# Patient Record
Sex: FEMALE | Race: WHITE
[De-identification: names, ages, dates, MRNs, and addresses within clinical notes are randomized per-mention and may not be internally consistent; named-entity substitution may affect disease eponyms.]

---

## 2006-12-04 VITALS — DIASTOLIC BLOOD PRESSURE: 54 MMHG | SYSTOLIC BLOOD PRESSURE: 101 MMHG

## 2017-01-17 ENCOUNTER — HOSPITAL ENCOUNTER (EMERGENCY)
Dept: HOSPITAL 19 - COL.ER | Age: 57
LOS: 1 days | Discharge: HOME | End: 2017-01-18
Payer: COMMERCIAL

## 2017-01-17 VITALS — TEMPERATURE: 98.7 F | HEART RATE: 108 BPM

## 2017-01-17 VITALS — HEIGHT: 64.02 IN | BODY MASS INDEX: 41.06 KG/M2 | WEIGHT: 240.52 LBS

## 2017-01-17 DIAGNOSIS — E66.9: ICD-10-CM

## 2017-01-17 DIAGNOSIS — L02.211: Primary | ICD-10-CM

## 2017-01-17 DIAGNOSIS — L03.311: ICD-10-CM

## 2017-01-17 LAB
ADJUSTED CALCIUM: 10.5 MG/DL (ref 8.4–10.2)
ALBUMIN SERPL-MCNC: 4.1 GM/DL (ref 3.5–5)
ALP SERPL-CCNC: 99 U/L (ref 50–136)
ALT SERPL-CCNC: 31 U/L (ref 9–52)
ANION GAP SERPL CALC-SCNC: 9 MMOL/L (ref 7–16)
BASOPHILS # BLD: 0 10*3/UL (ref 0–0.2)
BASOPHILS NFR BLD AUTO: 0.3 % (ref 0–2)
BILIRUB SERPL-MCNC: 0.7 MG/DL (ref 0–1)
BUN SERPL-MCNC: 10 MG/DL (ref 7–17)
CALCIUM SERPL-MCNC: 10.6 MG/DL (ref 8.4–10.2)
CHLORIDE SERPL-SCNC: 102 MMOL/L (ref 98–107)
CO2 SERPL-SCNC: 29 MMOL/L (ref 22–30)
CREAT SERPL-SCNC: 0.68 MG/DL (ref 0.52–1.25)
CRP SERPL-MCNC: 2.2 MG/DL (ref 0–0.9)
EOSINOPHIL # BLD: 0.2 10*3/UL (ref 0–0.7)
EOSINOPHIL NFR BLD: 2.6 % (ref 0–4)
ERYTHROCYTE [DISTWIDTH] IN BLOOD BY AUTOMATED COUNT: 13.2 % (ref 11.5–14.5)
GLUCOSE SERPL-MCNC: 126 MG/DL (ref 74–106)
GRANULOCYTES # BLD AUTO: 66.1 % (ref 42.2–75.2)
HCT VFR BLD AUTO: 45.3 % (ref 37–47)
HGB BLD-MCNC: 15 G/DL (ref 12.5–16)
LYMPHOCYTES # BLD: 2.1 10*3/UL (ref 1.2–3.4)
LYMPHOCYTES NFR BLD: 23.8 % (ref 20–51)
MCH RBC QN AUTO: 31 PG (ref 27–31)
MCHC RBC AUTO-ENTMCNC: 33 G/DL (ref 33–37)
MCV RBC AUTO: 94 FL (ref 80–100)
MONOCYTES # BLD: 0.6 10*3/UL (ref 0.1–0.6)
MONOCYTES NFR BLD AUTO: 6.8 % (ref 1.7–9.3)
NEUTROPHILS # BLD: 5.9 10*3/UL (ref 1.4–6.5)
PLATELET # BLD AUTO: 212 K/MM3 (ref 130–400)
PMV BLD AUTO: 11.7 FL (ref 7.4–10.4)
POTASSIUM SERPL-SCNC: 4.2 MMOL/L (ref 3.4–5)
PROT SERPL-MCNC: 7.4 GM/DL (ref 6.4–8.2)
RBC # BLD AUTO: 4.81 M/MM3 (ref 4.1–5.3)
SODIUM SERPL-SCNC: 140 MMOL/L (ref 137–145)
WBC # BLD AUTO: 8.9 K/MM3 (ref 4.8–10.8)

## 2017-01-18 VITALS — SYSTOLIC BLOOD PRESSURE: 125 MMHG | DIASTOLIC BLOOD PRESSURE: 57 MMHG

## 2018-12-15 ENCOUNTER — HOSPITAL ENCOUNTER (OUTPATIENT)
Dept: HOSPITAL 6 - ED | Age: 58
Setting detail: OBSERVATION
LOS: 4 days | Discharge: HOME | End: 2018-12-19
Attending: GENERAL PRACTICE | Admitting: GENERAL PRACTICE
Payer: SELF-PAY

## 2018-12-15 VITALS — SYSTOLIC BLOOD PRESSURE: 127 MMHG | DIASTOLIC BLOOD PRESSURE: 81 MMHG

## 2018-12-15 VITALS — DIASTOLIC BLOOD PRESSURE: 90 MMHG | SYSTOLIC BLOOD PRESSURE: 142 MMHG

## 2018-12-15 VITALS — HEIGHT: 64.02 IN | WEIGHT: 276.68 LBS | BODY MASS INDEX: 47.24 KG/M2

## 2018-12-15 DIAGNOSIS — I10: ICD-10-CM

## 2018-12-15 DIAGNOSIS — J96.11: ICD-10-CM

## 2018-12-15 DIAGNOSIS — G47.36: ICD-10-CM

## 2018-12-15 DIAGNOSIS — Z91.14: ICD-10-CM

## 2018-12-15 DIAGNOSIS — F17.210: ICD-10-CM

## 2018-12-15 DIAGNOSIS — Z99.81: ICD-10-CM

## 2018-12-15 DIAGNOSIS — Z83.3: ICD-10-CM

## 2018-12-15 DIAGNOSIS — I47.1: ICD-10-CM

## 2018-12-15 DIAGNOSIS — E66.9: ICD-10-CM

## 2018-12-15 DIAGNOSIS — K21.9: ICD-10-CM

## 2018-12-15 DIAGNOSIS — R73.9: ICD-10-CM

## 2018-12-15 DIAGNOSIS — J44.1: Primary | ICD-10-CM

## 2018-12-15 DIAGNOSIS — G47.30: ICD-10-CM

## 2018-12-15 DIAGNOSIS — Z82.49: ICD-10-CM

## 2018-12-15 DIAGNOSIS — Z79.82: ICD-10-CM

## 2018-12-15 DIAGNOSIS — Z79.899: ICD-10-CM

## 2018-12-15 LAB
ALBUMIN SERPL-MCNC: 4.1 G/DL (ref 3.5–5)
ALT SERPL-CCNC: 16 U/L (ref 9–52)
APPEARANCE UR: (no result)
AST SERPL-CCNC: 10 U/L (ref 14–36)
BASOPHILS # BLD: 0 10*3/UL (ref 0.02–0.1)
BILIRUB SERPL-MCNC: 0.8 MG/DL (ref 0.2–1.3)
BILIRUB UR QL STRIP.AUTO: NEGATIVE
CALCIUM SERPL-MCNC: 10.2 MG/DL (ref 8.4–10.2)
CO2 SERPL-SCNC: 32 MMOL/L (ref 22–30)
COLOR UR AUTO: YELLOW
EOSINOPHIL # BLD: 0.1 10*3/UL (ref 0.04–0.4)
EOSINOPHIL NFR BLD: 1.3 % (ref 1–5)
ERYTHROCYTE [DISTWIDTH] IN BLOOD BY AUTOMATED COUNT: 13.8 % (ref 11.5–14.5)
GLUCOSE SERPL-MCNC: 104 MG/DL (ref 65–105)
GLUCOSE UR QL STRIP.AUTO: NEGATIVE MG/DL
HCT VFR BLD AUTO: 47.1 % (ref 37–47)
HGB BLD-MCNC: 15.1 G/DL (ref 12.5–16)
KETONES UR QL STRIP.AUTO: NEGATIVE
LEUKOCYTE ESTERASE UR QL STRIP: NEGATIVE
LYMPHOCYTES # BLD: 1.2 10*3/UL (ref 1.5–4)
MCH RBC QN AUTO: 31 PG (ref 27–31)
MCHC RBC AUTO-ENTMCNC: 32 G/DL (ref 33–37)
MCV RBC AUTO: 97 FL (ref 78–100)
MONOCYTES # BLD: 0.6 10*3/UL (ref 0.2–0.8)
MUCOUS THREADS URNS QL MICRO: PRESENT
NEUTROPHILS # BLD: 7.1 10*3/UL (ref 1.4–6.5)
NITRITE UR QL STRIP: NEGATIVE
PH UR STRIP.AUTO: 5 [PH] (ref 5–8)
PLATELET # BLD AUTO: 235 K/MM3 (ref 130–400)
PMV BLD AUTO: 10.7 FL (ref 7.4–10.4)
POTASSIUM SERPL-SCNC: 3.9 MMOL/L (ref 3.6–5)
PROT ?TM UR-MCNC: (no result) MG/DL
PROT SERPL-MCNC: 7.6 G/DL (ref 6.3–8.2)
RBC # BLD AUTO: 4.86 M/MM3 (ref 4.1–5.3)
RBC UR QL AUTO: (no result)
SODIUM SERPL-SCNC: 139 MMOL/L (ref 137–145)
SP GR UR STRIP.AUTO: 1.02 (ref 1–1.03)
UROBILINOGEN UR-MCNC: NORMAL MG/DL
WBC # BLD AUTO: 9.1 K/MM3 (ref 4.8–10.8)
WBC #/AREA URNS HPF: (no result) /HPF (ref 0–3)

## 2018-12-15 PROCEDURE — G0378 HOSPITAL OBSERVATION PER HR: HCPCS

## 2018-12-16 VITALS — DIASTOLIC BLOOD PRESSURE: 68 MMHG | SYSTOLIC BLOOD PRESSURE: 106 MMHG

## 2018-12-16 VITALS — DIASTOLIC BLOOD PRESSURE: 55 MMHG | SYSTOLIC BLOOD PRESSURE: 106 MMHG

## 2018-12-16 VITALS — DIASTOLIC BLOOD PRESSURE: 88 MMHG | SYSTOLIC BLOOD PRESSURE: 156 MMHG

## 2018-12-16 VITALS — SYSTOLIC BLOOD PRESSURE: 169 MMHG | DIASTOLIC BLOOD PRESSURE: 89 MMHG

## 2018-12-16 VITALS — DIASTOLIC BLOOD PRESSURE: 71 MMHG | SYSTOLIC BLOOD PRESSURE: 139 MMHG

## 2018-12-16 VITALS — DIASTOLIC BLOOD PRESSURE: 70 MMHG | SYSTOLIC BLOOD PRESSURE: 114 MMHG

## 2018-12-16 LAB
CALCIUM SERPL-MCNC: 10.1 MG/DL (ref 8.4–10.2)
CO2 SERPL-SCNC: 33 MMOL/L (ref 22–30)
ERYTHROCYTE [DISTWIDTH] IN BLOOD BY AUTOMATED COUNT: 13.6 % (ref 11.5–14.5)
GLUCOSE SERPL-MCNC: 159 MG/DL (ref 65–105)
HCT VFR BLD AUTO: 48.1 % (ref 37–47)
HGB BLD-MCNC: 15.2 G/DL (ref 12.5–16)
LYMPHOCYTES NFR BLD MANUAL: 6 % (ref 20–51)
MCH RBC QN AUTO: 31 PG (ref 27–31)
MCHC RBC AUTO-ENTMCNC: 32 G/DL (ref 33–37)
MCV RBC AUTO: 98 FL (ref 78–100)
MONOCYTES NFR BLD: 3 % (ref 3–10)
NEUTS SEG NFR BLD MANUAL: 91 % (ref 42–75)
PLATELET # BLD AUTO: 199 K/MM3 (ref 130–400)
PMV BLD AUTO: 11.8 FL (ref 7.4–10.4)
POTASSIUM SERPL-SCNC: 4.5 MMOL/L (ref 3.6–5)
RBC # BLD AUTO: 4.93 M/MM3 (ref 4.1–5.3)
SODIUM SERPL-SCNC: 138 MMOL/L (ref 137–145)
WBC # BLD AUTO: 8.8 K/MM3 (ref 4.8–10.8)

## 2018-12-17 VITALS — SYSTOLIC BLOOD PRESSURE: 101 MMHG | DIASTOLIC BLOOD PRESSURE: 61 MMHG

## 2018-12-17 VITALS — SYSTOLIC BLOOD PRESSURE: 105 MMHG | DIASTOLIC BLOOD PRESSURE: 57 MMHG

## 2018-12-17 VITALS — SYSTOLIC BLOOD PRESSURE: 137 MMHG | DIASTOLIC BLOOD PRESSURE: 83 MMHG

## 2018-12-17 VITALS — DIASTOLIC BLOOD PRESSURE: 77 MMHG | SYSTOLIC BLOOD PRESSURE: 151 MMHG

## 2018-12-17 VITALS — DIASTOLIC BLOOD PRESSURE: 71 MMHG | SYSTOLIC BLOOD PRESSURE: 130 MMHG

## 2018-12-17 LAB
ALBUMIN SERPL-MCNC: 4.2 G/DL (ref 3.5–5)
ALT SERPL-CCNC: 11 U/L (ref 9–52)
AST SERPL-CCNC: 18 U/L (ref 14–36)
BILIRUB SERPL-MCNC: 0.3 MG/DL (ref 0.2–1.3)
CALCIUM SERPL-MCNC: 10.8 MG/DL (ref 8.4–10.2)
CO2 SERPL-SCNC: 32 MMOL/L (ref 22–30)
ERYTHROCYTE [DISTWIDTH] IN BLOOD BY AUTOMATED COUNT: 13.8 % (ref 11.5–14.5)
GLUCOSE SERPL-MCNC: 151 MG/DL (ref 65–105)
HCT VFR BLD AUTO: 46.9 % (ref 37–47)
HGB BLD-MCNC: 14.8 G/DL (ref 12.5–16)
LYMPHOCYTES NFR BLD MANUAL: 7 % (ref 20–51)
MCH RBC QN AUTO: 31 PG (ref 27–31)
MCHC RBC AUTO-ENTMCNC: 32 G/DL (ref 33–37)
MCV RBC AUTO: 98 FL (ref 78–100)
MONOCYTES NFR BLD: 5 % (ref 3–10)
NEUTS SEG NFR BLD MANUAL: 88 % (ref 42–75)
PLATELET # BLD AUTO: 252 K/MM3 (ref 130–400)
PMV BLD AUTO: 11.4 FL (ref 7.4–10.4)
POTASSIUM SERPL-SCNC: 4.5 MMOL/L (ref 3.6–5)
PROT SERPL-MCNC: 7.4 G/DL (ref 6.3–8.2)
RBC # BLD AUTO: 4.79 M/MM3 (ref 4.1–5.3)
SODIUM SERPL-SCNC: 136 MMOL/L (ref 137–145)
WBC # BLD AUTO: 14.8 K/MM3 (ref 4.8–10.8)

## 2018-12-18 ENCOUNTER — HOSPITAL ENCOUNTER (OUTPATIENT)
Dept: HOSPITAL 19 - ZLAB.WCH | Age: 58
End: 2018-12-18

## 2018-12-18 VITALS — SYSTOLIC BLOOD PRESSURE: 133 MMHG | DIASTOLIC BLOOD PRESSURE: 61 MMHG

## 2018-12-18 VITALS — SYSTOLIC BLOOD PRESSURE: 160 MMHG | DIASTOLIC BLOOD PRESSURE: 66 MMHG

## 2018-12-18 VITALS — SYSTOLIC BLOOD PRESSURE: 145 MMHG | DIASTOLIC BLOOD PRESSURE: 84 MMHG

## 2018-12-18 VITALS — DIASTOLIC BLOOD PRESSURE: 60 MMHG | SYSTOLIC BLOOD PRESSURE: 105 MMHG

## 2018-12-18 VITALS — SYSTOLIC BLOOD PRESSURE: 154 MMHG | DIASTOLIC BLOOD PRESSURE: 73 MMHG

## 2018-12-18 VITALS — SYSTOLIC BLOOD PRESSURE: 128 MMHG | DIASTOLIC BLOOD PRESSURE: 75 MMHG

## 2018-12-18 DIAGNOSIS — Z01.89: Primary | ICD-10-CM

## 2018-12-18 LAB
APPEARANCE UR: CLEAR
BASOPHILS # BLD: 0 10*3/UL (ref 0.02–0.1)
BILIRUB UR QL STRIP.AUTO: NEGATIVE
CALCIUM SERPL-MCNC: 10.8 MG/DL (ref 8.4–10.2)
CO2 SERPL-SCNC: 35 MMOL/L (ref 22–30)
COLOR UR AUTO: YELLOW
EOSINOPHIL # BLD: 0 10*3/UL (ref 0.04–0.4)
EOSINOPHIL NFR BLD: 0.1 % (ref 1–5)
ERYTHROCYTE [DISTWIDTH] IN BLOOD BY AUTOMATED COUNT: 13.9 % (ref 11.5–14.5)
GLUCOSE SERPL-MCNC: 119 MG/DL (ref 65–105)
GLUCOSE UR QL STRIP.AUTO: NEGATIVE MG/DL
HCT VFR BLD AUTO: 46.8 % (ref 37–47)
HGB BLD-MCNC: 14.2 G/DL (ref 12.5–16)
KETONES UR QL STRIP.AUTO: NEGATIVE
LEUKOCYTE ESTERASE UR QL STRIP: NEGATIVE
LYMPHOCYTES # BLD: 1.7 10*3/UL (ref 1.5–4)
MCH RBC QN AUTO: 30 PG (ref 27–31)
MCHC RBC AUTO-ENTMCNC: 30 G/DL (ref 33–37)
MCV RBC AUTO: 99 FL (ref 78–100)
MONOCYTES # BLD: 0.8 10*3/UL (ref 0.2–0.8)
MUCOUS THREADS URNS QL MICRO: PRESENT
NEUTROPHILS # BLD: 8.4 10*3/UL (ref 1.4–6.5)
NITRITE UR QL STRIP: NEGATIVE
PH UR STRIP.AUTO: 5 [PH] (ref 5–8)
PLATELET # BLD AUTO: 247 K/MM3 (ref 130–400)
PMV BLD AUTO: 11.5 FL (ref 7.4–10.4)
POTASSIUM SERPL-SCNC: 4 MMOL/L (ref 3.6–5)
PROT ?TM UR-MCNC: (no result) MG/DL
RBC # BLD AUTO: 4.73 M/MM3 (ref 4.1–5.3)
RBC UR QL AUTO: NEGATIVE
SODIUM SERPL-SCNC: 137 MMOL/L (ref 137–145)
SP GR UR STRIP.AUTO: 1.01 (ref 1–1.03)
UROBILINOGEN UR-MCNC: NORMAL MG/DL
WBC # BLD AUTO: 11 K/MM3 (ref 4.8–10.8)
WBC #/AREA URNS HPF: (no result) /HPF (ref 0–3)

## 2018-12-19 VITALS — SYSTOLIC BLOOD PRESSURE: 152 MMHG | DIASTOLIC BLOOD PRESSURE: 69 MMHG

## 2018-12-19 VITALS
DIASTOLIC BLOOD PRESSURE: 60 MMHG | SYSTOLIC BLOOD PRESSURE: 141 MMHG | DIASTOLIC BLOOD PRESSURE: 60 MMHG | DIASTOLIC BLOOD PRESSURE: 60 MMHG | SYSTOLIC BLOOD PRESSURE: 141 MMHG | SYSTOLIC BLOOD PRESSURE: 141 MMHG

## 2018-12-19 LAB
BASOPHILS # BLD: 0 10*3/UL (ref 0.02–0.1)
CALCIUM SERPL-MCNC: 10.3 MG/DL (ref 8.4–10.2)
CO2 SERPL-SCNC: 35 MMOL/L (ref 22–30)
EOSINOPHIL # BLD: 0 10*3/UL (ref 0.04–0.4)
EOSINOPHIL NFR BLD: 0.5 % (ref 1–5)
ERYTHROCYTE [DISTWIDTH] IN BLOOD BY AUTOMATED COUNT: 13.8 % (ref 11.5–14.5)
GLUCOSE SERPL-MCNC: 96 MG/DL (ref 65–105)
HCT VFR BLD AUTO: 44.3 % (ref 37–47)
HGB BLD-MCNC: 13.8 G/DL (ref 12.5–16)
LYMPHOCYTES # BLD: 2.2 10*3/UL (ref 1.5–4)
MCH RBC QN AUTO: 31 PG (ref 27–31)
MCHC RBC AUTO-ENTMCNC: 31 G/DL (ref 33–37)
MCV RBC AUTO: 100 FL (ref 78–100)
MONOCYTES # BLD: 0.8 10*3/UL (ref 0.2–0.8)
NEUTROPHILS # BLD: 5.2 10*3/UL (ref 1.4–6.5)
PLATELET # BLD AUTO: 213 K/MM3 (ref 130–400)
PMV BLD AUTO: 11.4 FL (ref 7.4–10.4)
POTASSIUM SERPL-SCNC: 4.1 MMOL/L (ref 3.6–5)
RBC # BLD AUTO: 4.45 M/MM3 (ref 4.1–5.3)
SODIUM SERPL-SCNC: 138 MMOL/L (ref 137–145)
WBC # BLD AUTO: 8.2 K/MM3 (ref 4.8–10.8)

## 2018-12-20 ENCOUNTER — HOSPITAL ENCOUNTER (OUTPATIENT)
Dept: HOSPITAL 6 - RAD | Age: 58
End: 2018-12-20
Attending: NURSE PRACTITIONER
Payer: SELF-PAY

## 2018-12-20 DIAGNOSIS — I47.1: ICD-10-CM

## 2018-12-20 DIAGNOSIS — I08.2: Primary | ICD-10-CM

## 2019-01-03 ENCOUNTER — HOSPITAL ENCOUNTER (OUTPATIENT)
Dept: HOSPITAL 6 - LAB | Age: 59
End: 2019-01-03
Attending: INTERNAL MEDICINE
Payer: COMMERCIAL

## 2019-01-03 ENCOUNTER — HOSPITAL ENCOUNTER (OUTPATIENT)
Dept: HOSPITAL 19 - ZLAB.WCH | Age: 59
End: 2019-01-03

## 2019-01-03 DIAGNOSIS — Z01.89: Primary | ICD-10-CM

## 2019-01-03 DIAGNOSIS — I10: ICD-10-CM

## 2019-01-03 DIAGNOSIS — R94.6: ICD-10-CM

## 2019-01-03 DIAGNOSIS — J96.91: Primary | ICD-10-CM

## 2019-01-03 DIAGNOSIS — I47.1: ICD-10-CM

## 2019-01-03 LAB
ALBUMIN SERPL-MCNC: 3.8 G/DL (ref 3.5–5)
ALT SERPL-CCNC: 32 U/L (ref 9–52)
AST SERPL-CCNC: 12 U/L (ref 14–36)
BASOPHILS # BLD: 0 10*3/UL (ref 0.02–0.1)
BILIRUB SERPL-MCNC: 0.6 MG/DL (ref 0.2–1.3)
CALCIUM SERPL-MCNC: 10.2 MG/DL (ref 8.4–10.2)
CO2 SERPL-SCNC: 32 MMOL/L (ref 22–30)
EOSINOPHIL # BLD: 0.2 10*3/UL (ref 0.04–0.4)
EOSINOPHIL NFR BLD: 2.5 % (ref 1–5)
ERYTHROCYTE [DISTWIDTH] IN BLOOD BY AUTOMATED COUNT: 14.1 % (ref 11.5–14.5)
GLUCOSE SERPL-MCNC: 98 MG/DL (ref 65–105)
HCT VFR BLD AUTO: 45.1 % (ref 37–47)
HGB BLD-MCNC: 14.4 G/DL (ref 12.5–16)
LYMPHOCYTES # BLD: 1.7 10*3/UL (ref 1.5–4)
MCH RBC QN AUTO: 31 PG (ref 27–31)
MCHC RBC AUTO-ENTMCNC: 32 G/DL (ref 33–37)
MCV RBC AUTO: 98 FL (ref 78–100)
MONOCYTES # BLD: 0.6 10*3/UL (ref 0.2–0.8)
NEUTROPHILS # BLD: 6.9 10*3/UL (ref 1.4–6.5)
PLATELET # BLD AUTO: 190 K/MM3 (ref 130–400)
PMV BLD AUTO: 11.1 FL (ref 7.4–10.4)
POTASSIUM SERPL-SCNC: 4.4 MMOL/L (ref 3.6–5)
PROT SERPL-MCNC: 6.8 G/DL (ref 6.3–8.2)
RBC # BLD AUTO: 4.62 M/MM3 (ref 4.1–5.3)
SODIUM SERPL-SCNC: 137 MMOL/L (ref 137–145)
TSH SERPL DL<=0.005 MIU/L-ACNC: 0.29 UIU/ML (ref 0.47–4.68)
WBC # BLD AUTO: 9.4 K/MM3 (ref 4.8–10.8)

## 2019-01-04 ENCOUNTER — HOSPITAL ENCOUNTER (OUTPATIENT)
Dept: HOSPITAL 6 - AMSURD | Age: 59
End: 2019-01-04
Payer: COMMERCIAL

## 2019-01-04 VITALS — WEIGHT: 276.68 LBS | HEIGHT: 64.02 IN | BODY MASS INDEX: 47.24 KG/M2

## 2019-01-04 VITALS
SYSTOLIC BLOOD PRESSURE: 144 MMHG | SYSTOLIC BLOOD PRESSURE: 144 MMHG | DIASTOLIC BLOOD PRESSURE: 84 MMHG | SYSTOLIC BLOOD PRESSURE: 144 MMHG | SYSTOLIC BLOOD PRESSURE: 144 MMHG | DIASTOLIC BLOOD PRESSURE: 84 MMHG | DIASTOLIC BLOOD PRESSURE: 84 MMHG | SYSTOLIC BLOOD PRESSURE: 144 MMHG | DIASTOLIC BLOOD PRESSURE: 84 MMHG | SYSTOLIC BLOOD PRESSURE: 144 MMHG | DIASTOLIC BLOOD PRESSURE: 84 MMHG | DIASTOLIC BLOOD PRESSURE: 84 MMHG | SYSTOLIC BLOOD PRESSURE: 144 MMHG | DIASTOLIC BLOOD PRESSURE: 84 MMHG

## 2019-01-04 DIAGNOSIS — R06.02: Primary | ICD-10-CM

## 2019-01-04 LAB — T3 SERPL-MCNC: 131 NG/DL (ref 87–178)

## 2019-01-24 ENCOUNTER — HOSPITAL ENCOUNTER (OUTPATIENT)
Dept: HOSPITAL 6 - RAD | Age: 59
End: 2019-01-24
Attending: INTERNAL MEDICINE
Payer: COMMERCIAL

## 2019-01-24 DIAGNOSIS — E04.1: Primary | ICD-10-CM

## 2019-01-24 DIAGNOSIS — E05.90: ICD-10-CM

## 2019-02-11 ENCOUNTER — HOSPITAL ENCOUNTER (OUTPATIENT)
Dept: HOSPITAL 6 - LAB | Age: 59
End: 2019-02-11
Attending: INTERNAL MEDICINE
Payer: COMMERCIAL

## 2019-02-11 ENCOUNTER — HOSPITAL ENCOUNTER (OUTPATIENT)
Dept: HOSPITAL 19 - ZLAB.WCH | Age: 59
End: 2019-02-11

## 2019-02-11 DIAGNOSIS — R94.6: ICD-10-CM

## 2019-02-11 DIAGNOSIS — I47.1: ICD-10-CM

## 2019-02-11 DIAGNOSIS — Z01.89: Primary | ICD-10-CM

## 2019-02-11 DIAGNOSIS — J96.91: Primary | ICD-10-CM

## 2019-02-11 DIAGNOSIS — I10: ICD-10-CM

## 2019-02-11 DIAGNOSIS — J96.92: ICD-10-CM

## 2019-02-11 LAB — TSH SERPL DL<=0.005 MIU/L-ACNC: 0.25 UIU/ML (ref 0.47–4.68)

## 2019-02-12 LAB — T3 SERPL-MCNC: 126 NG/DL (ref 87–178)

## 2019-03-07 ENCOUNTER — HOSPITAL ENCOUNTER (OUTPATIENT)
Dept: HOSPITAL 6 - LAB | Age: 59
End: 2019-03-07
Attending: INTERNAL MEDICINE
Payer: COMMERCIAL

## 2019-03-07 DIAGNOSIS — N30.00: Primary | ICD-10-CM

## 2019-03-07 LAB
APPEARANCE UR: CLEAR
BILIRUB UR QL STRIP.AUTO: NEGATIVE
COLOR UR AUTO: (no result)
GLUCOSE UR QL STRIP.AUTO: NEGATIVE MG/DL
KETONES UR QL STRIP.AUTO: NEGATIVE
LEUKOCYTE ESTERASE UR QL STRIP: (no result)
NITRITE UR QL STRIP: NEGATIVE
PH UR STRIP.AUTO: 6 [PH] (ref 5–8)
PROT ?TM UR-MCNC: NEGATIVE MG/DL
RBC UR QL AUTO: (no result)
SP GR UR STRIP.AUTO: 1 (ref 1–1.03)
UROBILINOGEN UR-MCNC: NORMAL MG/DL
WBC #/AREA URNS HPF: (no result) /HPF (ref 0–3)

## 2019-03-21 ENCOUNTER — HOSPITAL ENCOUNTER (OUTPATIENT)
Dept: HOSPITAL 6 - LAB | Age: 59
End: 2019-03-21
Attending: INTERNAL MEDICINE
Payer: COMMERCIAL

## 2019-03-21 ENCOUNTER — HOSPITAL ENCOUNTER (OUTPATIENT)
Dept: HOSPITAL 19 - COL.PUL | Age: 59
End: 2019-03-21
Payer: COMMERCIAL

## 2019-03-21 DIAGNOSIS — J44.9: Primary | ICD-10-CM

## 2019-03-21 DIAGNOSIS — Z87.891: ICD-10-CM

## 2019-03-21 DIAGNOSIS — E78.5: Primary | ICD-10-CM

## 2019-03-21 LAB
ALBUMIN SERPL-MCNC: 4 G/DL (ref 3.5–5)
ALT SERPL-CCNC: 16 U/L (ref 9–52)
AST SERPL-CCNC: 16 U/L (ref 14–36)
BILIRUB SERPL-MCNC: 0.7 MG/DL (ref 0.2–1.3)
CALCIUM SERPL-MCNC: 10.2 MG/DL (ref 8.4–10.2)
CO2 SERPL-SCNC: 26 MMOL/L (ref 22–30)
GLUCOSE SERPL-MCNC: 131 MG/DL (ref 65–105)
POTASSIUM SERPL-SCNC: 3.7 MMOL/L (ref 3.6–5)
PROT SERPL-MCNC: 7 G/DL (ref 6.3–8.2)
SODIUM SERPL-SCNC: 140 MMOL/L (ref 137–145)

## 2019-05-13 ENCOUNTER — HOSPITAL ENCOUNTER (OUTPATIENT)
Dept: HOSPITAL 6 - CARDREHAB | Age: 59
LOS: 8 days | Discharge: HOME | End: 2019-05-21
Payer: COMMERCIAL

## 2019-05-13 DIAGNOSIS — Z95.5: ICD-10-CM

## 2019-05-13 DIAGNOSIS — Z48.812: Primary | ICD-10-CM

## 2019-06-18 ENCOUNTER — HOSPITAL ENCOUNTER (OUTPATIENT)
Dept: HOSPITAL 6 - LAB | Age: 59
End: 2019-06-18
Attending: INTERNAL MEDICINE
Payer: COMMERCIAL

## 2019-06-18 DIAGNOSIS — I51.7: Primary | ICD-10-CM

## 2019-06-18 DIAGNOSIS — E03.4: ICD-10-CM

## 2019-06-18 LAB
ALBUMIN SERPL-MCNC: 4.1 G/DL (ref 3.5–5)
ALT SERPL-CCNC: 11 U/L (ref 0–55)
AST SERPL-CCNC: 9 U/L (ref 5–34)
BASOPHILS # BLD: 0 10*3/UL (ref 0.02–0.1)
BILIRUB SERPL-MCNC: 0.6 MG/DL (ref 0.2–1.2)
CALCIUM SERPL-MCNC: 10.5 MG/DL (ref 8.3–10.5)
CO2 SERPL-SCNC: 27 MMOL/L (ref 22–29)
EOSINOPHIL # BLD: 0.4 10*3/UL (ref 0.04–0.4)
EOSINOPHIL NFR BLD: 5.5 % (ref 1–5)
ERYTHROCYTE [DISTWIDTH] IN BLOOD BY AUTOMATED COUNT: 14.2 % (ref 11.5–14.5)
GLUCOSE SERPL-MCNC: 92 MG/DL (ref 65–105)
HCT VFR BLD AUTO: 41.6 % (ref 37–47)
HGB BLD-MCNC: 13 G/DL (ref 12.5–16)
LYMPHOCYTES # BLD: 1.6 10*3/UL (ref 1.5–4)
MCH RBC QN AUTO: 29 PG (ref 27–31)
MCHC RBC AUTO-ENTMCNC: 31 G/DL (ref 33–37)
MCV RBC AUTO: 94 FL (ref 78–100)
MONOCYTES # BLD: 0.5 10*3/UL (ref 0.2–0.8)
NEUTROPHILS # BLD: 4.6 10*3/UL (ref 1.4–6.5)
PLATELET # BLD AUTO: 217 K/MM3 (ref 130–400)
PMV BLD AUTO: 10.9 FL (ref 7.4–10.4)
POTASSIUM SERPL-SCNC: 4.1 MMOL/L (ref 3.5–5.1)
PROT SERPL-MCNC: 7.1 G/DL (ref 6.4–8.3)
RBC # BLD AUTO: 4.42 M/MM3 (ref 4.1–5.3)
SODIUM SERPL-SCNC: 139 MMOL/L (ref 136–145)
WBC # BLD AUTO: 7.1 K/MM3 (ref 4.8–10.8)

## 2019-07-03 ENCOUNTER — HOSPITAL ENCOUNTER (OUTPATIENT)
Dept: HOSPITAL 19 - COL.PUL | Age: 59
End: 2019-07-03
Attending: INTERNAL MEDICINE
Payer: COMMERCIAL

## 2019-07-03 DIAGNOSIS — J96.10: Primary | ICD-10-CM

## 2019-07-03 LAB
BASE EXCESS BLDA CALC-SCNC: 1.1 MMOL/L (ref -2–2)
CO2 BLDA-SCNC: 26.9 MMOL/L
HCO3 BLDA-SCNC: 25.7 MEQ/L (ref 22–26)
PCO2 BLDA: 40.6 MMHG (ref 35–45)
PO2 BLDA: 88.8 MMHG (ref 80–100)
SAO2 % BLDA: 96.7 % (ref 92–100)

## 2019-09-13 ENCOUNTER — HOSPITAL ENCOUNTER (EMERGENCY)
Dept: HOSPITAL 6 - ED | Age: 59
Discharge: HOME | End: 2019-09-13
Payer: COMMERCIAL

## 2019-09-13 VITALS
DIASTOLIC BLOOD PRESSURE: 55 MMHG | DIASTOLIC BLOOD PRESSURE: 55 MMHG | SYSTOLIC BLOOD PRESSURE: 120 MMHG | SYSTOLIC BLOOD PRESSURE: 120 MMHG

## 2019-09-13 VITALS — BODY MASS INDEX: 50.02 KG/M2 | HEIGHT: 64.02 IN | WEIGHT: 293 LBS

## 2019-09-13 DIAGNOSIS — Z79.02: ICD-10-CM

## 2019-09-13 DIAGNOSIS — J44.9: ICD-10-CM

## 2019-09-13 DIAGNOSIS — Z87.891: ICD-10-CM

## 2019-09-13 DIAGNOSIS — Z90.710: ICD-10-CM

## 2019-09-13 DIAGNOSIS — Z95.5: ICD-10-CM

## 2019-09-13 DIAGNOSIS — Z90.49: ICD-10-CM

## 2019-09-13 DIAGNOSIS — Z99.81: ICD-10-CM

## 2019-09-13 DIAGNOSIS — Z79.82: ICD-10-CM

## 2019-09-13 DIAGNOSIS — K21.9: ICD-10-CM

## 2019-09-13 DIAGNOSIS — I25.119: Primary | ICD-10-CM

## 2019-09-13 LAB
ALBUMIN SERPL-MCNC: 4 G/DL (ref 3.5–5)
ALT SERPL-CCNC: 10 U/L (ref 0–55)
AST SERPL-CCNC: 9 U/L (ref 5–34)
BASOPHILS # BLD: 0 10*3/UL (ref 0.02–0.1)
BILIRUB SERPL-MCNC: 0.6 MG/DL (ref 0.2–1.2)
CALCIUM SERPL-MCNC: 10.1 MG/DL (ref 8.3–10.5)
CO2 SERPL-SCNC: 28 MMOL/L (ref 22–29)
EOSINOPHIL # BLD: 0.3 10*3/UL (ref 0.04–0.4)
EOSINOPHIL NFR BLD: 4.3 % (ref 1–5)
ERYTHROCYTE [DISTWIDTH] IN BLOOD BY AUTOMATED COUNT: 14.2 % (ref 11.5–14.5)
GLUCOSE SERPL-MCNC: 104 MG/DL (ref 65–105)
HCT VFR BLD AUTO: 41.1 % (ref 37–47)
HGB BLD-MCNC: 13 G/DL (ref 12.5–16)
LYMPHOCYTES # BLD: 1.6 10*3/UL (ref 1.5–4)
MCH RBC QN AUTO: 30 PG (ref 27–31)
MCHC RBC AUTO-ENTMCNC: 32 G/DL (ref 33–37)
MCV RBC AUTO: 93 FL (ref 78–100)
MONOCYTES # BLD: 0.6 10*3/UL (ref 0.2–0.8)
NEUTROPHILS # BLD: 4.7 10*3/UL (ref 1.4–6.5)
PLATELET # BLD AUTO: 224 K/MM3 (ref 130–400)
PMV BLD AUTO: 10.7 FL (ref 7.4–10.4)
POTASSIUM SERPL-SCNC: 3.8 MMOL/L (ref 3.5–5.1)
PROT SERPL-MCNC: 7.6 G/DL (ref 6.4–8.3)
RBC # BLD AUTO: 4.41 M/MM3 (ref 4.1–5.3)
SODIUM SERPL-SCNC: 140 MMOL/L (ref 136–145)
TROPONIN I SERPL-MCNC: < 0.03 NG/ML (ref ?–0.03)
WBC # BLD AUTO: 7.2 K/MM3 (ref 4.8–10.8)

## 2019-09-23 ENCOUNTER — HOSPITAL ENCOUNTER (OUTPATIENT)
Dept: HOSPITAL 6 - LAB | Age: 59
End: 2019-09-23
Attending: INTERNAL MEDICINE
Payer: COMMERCIAL

## 2019-09-23 DIAGNOSIS — R94.6: ICD-10-CM

## 2019-09-23 DIAGNOSIS — J96.91: Primary | ICD-10-CM

## 2019-09-23 DIAGNOSIS — I47.1: ICD-10-CM

## 2019-09-23 DIAGNOSIS — J96.92: ICD-10-CM

## 2019-09-23 DIAGNOSIS — I10: ICD-10-CM

## 2019-09-23 LAB
ALBUMIN SERPL-MCNC: 3.8 G/DL (ref 3.5–5)
ALT SERPL-CCNC: 9 U/L (ref 0–55)
AST SERPL-CCNC: 9 U/L (ref 5–34)
BASOPHILS # BLD: 0 10*3/UL (ref 0.02–0.1)
BILIRUB SERPL-MCNC: 0.5 MG/DL (ref 0.2–1.2)
CALCIUM SERPL-MCNC: 9.9 MG/DL (ref 8.3–10.5)
CO2 SERPL-SCNC: 27 MMOL/L (ref 22–29)
EOSINOPHIL # BLD: 0.3 10*3/UL (ref 0.04–0.4)
EOSINOPHIL NFR BLD: 3.6 % (ref 1–5)
ERYTHROCYTE [DISTWIDTH] IN BLOOD BY AUTOMATED COUNT: 14.4 % (ref 11.5–14.5)
GLUCOSE SERPL-MCNC: 120 MG/DL (ref 65–105)
HCT VFR BLD AUTO: 41.6 % (ref 37–47)
HGB BLD-MCNC: 13.1 G/DL (ref 12.5–16)
LYMPHOCYTES # BLD: 1.1 10*3/UL (ref 1.5–4)
MAGNESIUM SERPL-MCNC: 1.93 MG/DL (ref 1.6–2.6)
MCH RBC QN AUTO: 30 PG (ref 27–31)
MCHC RBC AUTO-ENTMCNC: 32 G/DL (ref 33–37)
MCV RBC AUTO: 94 FL (ref 78–100)
MONOCYTES # BLD: 0.5 10*3/UL (ref 0.2–0.8)
NEUTROPHILS # BLD: 5.4 10*3/UL (ref 1.4–6.5)
PLATELET # BLD AUTO: 199 K/MM3 (ref 130–400)
PMV BLD AUTO: 10.9 FL (ref 7.4–10.4)
POTASSIUM SERPL-SCNC: 4.5 MMOL/L (ref 3.5–5.1)
PROT SERPL-MCNC: 7.2 G/DL (ref 6.4–8.3)
RBC # BLD AUTO: 4.44 M/MM3 (ref 4.1–5.3)
SODIUM SERPL-SCNC: 140 MMOL/L (ref 136–145)
WBC # BLD AUTO: 7.2 K/MM3 (ref 4.8–10.8)

## 2019-10-10 ENCOUNTER — HOSPITAL ENCOUNTER (OUTPATIENT)
Dept: HOSPITAL 19 - COL.VAS | Age: 59
End: 2019-10-10
Attending: PHYSICIAN ASSISTANT
Payer: COMMERCIAL

## 2019-10-10 DIAGNOSIS — R06.02: Primary | ICD-10-CM

## 2019-11-10 ENCOUNTER — HOSPITAL ENCOUNTER (EMERGENCY)
Dept: HOSPITAL 6 - ED | Age: 59
Discharge: HOME | End: 2019-11-10
Payer: COMMERCIAL

## 2019-11-10 VITALS — HEIGHT: 64.02 IN | BODY MASS INDEX: 50.02 KG/M2 | WEIGHT: 293 LBS

## 2019-11-10 VITALS — SYSTOLIC BLOOD PRESSURE: 153 MMHG | DIASTOLIC BLOOD PRESSURE: 74 MMHG

## 2019-11-10 DIAGNOSIS — Z79.02: ICD-10-CM

## 2019-11-10 DIAGNOSIS — K21.9: ICD-10-CM

## 2019-11-10 DIAGNOSIS — I25.10: ICD-10-CM

## 2019-11-10 DIAGNOSIS — Z79.1: ICD-10-CM

## 2019-11-10 DIAGNOSIS — Z87.891: ICD-10-CM

## 2019-11-10 DIAGNOSIS — J44.9: ICD-10-CM

## 2019-11-10 DIAGNOSIS — R05: Primary | ICD-10-CM

## 2019-11-10 DIAGNOSIS — Z95.5: ICD-10-CM

## 2019-11-10 DIAGNOSIS — Z79.82: ICD-10-CM

## 2019-11-10 LAB
BASOPHILS # BLD: 0 10*3/UL (ref 0.02–0.1)
EOSINOPHIL # BLD: 0.3 10*3/UL (ref 0.04–0.4)
EOSINOPHIL NFR BLD: 5 % (ref 1–5)
ERYTHROCYTE [DISTWIDTH] IN BLOOD BY AUTOMATED COUNT: 14.3 % (ref 11.5–14.5)
HCT VFR BLD AUTO: 41.4 % (ref 37–47)
HGB BLD-MCNC: 12.9 G/DL (ref 12.5–16)
LYMPHOCYTES # BLD: 1.3 10*3/UL (ref 1.5–4)
MCH RBC QN AUTO: 29 PG (ref 27–31)
MCHC RBC AUTO-ENTMCNC: 31 G/DL (ref 33–37)
MCV RBC AUTO: 94 FL (ref 78–100)
MONOCYTES # BLD: 0.5 10*3/UL (ref 0.2–0.8)
NEUTROPHILS # BLD: 4.2 10*3/UL (ref 1.4–6.5)
PLATELET # BLD AUTO: 213 K/MM3 (ref 130–400)
PMV BLD AUTO: 10.8 FL (ref 7.4–10.4)
RBC # BLD AUTO: 4.4 M/MM3 (ref 4.1–5.3)
WBC # BLD AUTO: 6.4 K/MM3 (ref 4.8–10.8)

## 2020-01-06 ENCOUNTER — HOSPITAL ENCOUNTER (EMERGENCY)
Dept: HOSPITAL 6 - ED | Age: 60
Discharge: HOME | End: 2020-01-06
Payer: COMMERCIAL

## 2020-01-06 VITALS — WEIGHT: 293 LBS | HEIGHT: 55 IN

## 2020-01-06 VITALS — DIASTOLIC BLOOD PRESSURE: 57 MMHG | SYSTOLIC BLOOD PRESSURE: 125 MMHG

## 2020-01-06 DIAGNOSIS — I25.10: ICD-10-CM

## 2020-01-06 DIAGNOSIS — Z79.82: ICD-10-CM

## 2020-01-06 DIAGNOSIS — Z95.5: ICD-10-CM

## 2020-01-06 DIAGNOSIS — I11.0: ICD-10-CM

## 2020-01-06 DIAGNOSIS — R19.7: Primary | ICD-10-CM

## 2020-01-06 DIAGNOSIS — Z79.02: ICD-10-CM

## 2020-01-06 DIAGNOSIS — K21.9: ICD-10-CM

## 2020-01-06 DIAGNOSIS — I50.9: ICD-10-CM

## 2020-01-06 DIAGNOSIS — R11.2: ICD-10-CM

## 2020-01-06 DIAGNOSIS — Z79.1: ICD-10-CM

## 2020-01-06 LAB
ALBUMIN SERPL-MCNC: 3.8 G/DL (ref 3.5–5)
ALT SERPL-CCNC: 7 U/L (ref 0–55)
AST SERPL-CCNC: 5 U/L (ref 5–34)
BASOPHILS # BLD: 0 10*3/UL (ref 0.02–0.1)
BILIRUB SERPL-MCNC: 1.6 MG/DL (ref 0.2–1.2)
CALCIUM SERPL-MCNC: 10.3 MG/DL (ref 8.3–10.5)
CO2 SERPL-SCNC: 28 MMOL/L (ref 22–29)
EOSINOPHIL # BLD: 0.2 10*3/UL (ref 0.04–0.4)
EOSINOPHIL NFR BLD: 2.3 % (ref 1–5)
ERYTHROCYTE [DISTWIDTH] IN BLOOD BY AUTOMATED COUNT: 14.6 % (ref 11.5–14.5)
GLUCOSE SERPL-MCNC: 104 MG/DL (ref 65–105)
HCT VFR BLD AUTO: 37.8 % (ref 37–47)
HGB BLD-MCNC: 11.8 G/DL (ref 12.5–16)
LYMPHOCYTES # BLD: 1.1 10*3/UL (ref 1.5–4)
MCH RBC QN AUTO: 29 PG (ref 27–31)
MCHC RBC AUTO-ENTMCNC: 31 G/DL (ref 33–37)
MCV RBC AUTO: 93 FL (ref 78–100)
MONOCYTES # BLD: 0.7 10*3/UL (ref 0.2–0.8)
NEUTROPHILS # BLD: 6.2 10*3/UL (ref 1.4–6.5)
PLATELET # BLD AUTO: 186 K/MM3 (ref 130–400)
PMV BLD AUTO: 11 FL (ref 7.4–10.4)
POTASSIUM SERPL-SCNC: 4.1 MMOL/L (ref 3.5–5.1)
PROT SERPL-MCNC: 7.4 G/DL (ref 6.4–8.3)
RBC # BLD AUTO: 4.08 M/MM3 (ref 4.1–5.3)
SODIUM SERPL-SCNC: 137 MMOL/L (ref 136–145)
WBC # BLD AUTO: 8.1 K/MM3 (ref 4.8–10.8)

## 2020-02-24 ENCOUNTER — HOSPITAL ENCOUNTER (OUTPATIENT)
Dept: HOSPITAL 6 - ED | Age: 60
Setting detail: OBSERVATION
LOS: 2 days | Discharge: HOME | End: 2020-02-26
Attending: INTERNAL MEDICINE | Admitting: NURSE PRACTITIONER
Payer: COMMERCIAL

## 2020-02-24 VITALS — WEIGHT: 293 LBS | HEIGHT: 64.02 IN | BODY MASS INDEX: 50.02 KG/M2

## 2020-02-24 VITALS — DIASTOLIC BLOOD PRESSURE: 78 MMHG | SYSTOLIC BLOOD PRESSURE: 155 MMHG

## 2020-02-24 VITALS — DIASTOLIC BLOOD PRESSURE: 69 MMHG | SYSTOLIC BLOOD PRESSURE: 144 MMHG

## 2020-02-24 DIAGNOSIS — Z87.891: ICD-10-CM

## 2020-02-24 DIAGNOSIS — J96.10: ICD-10-CM

## 2020-02-24 DIAGNOSIS — E66.01: ICD-10-CM

## 2020-02-24 DIAGNOSIS — Z99.81: ICD-10-CM

## 2020-02-24 DIAGNOSIS — I10: ICD-10-CM

## 2020-02-24 DIAGNOSIS — Z79.82: ICD-10-CM

## 2020-02-24 DIAGNOSIS — I25.10: ICD-10-CM

## 2020-02-24 DIAGNOSIS — J44.0: ICD-10-CM

## 2020-02-24 DIAGNOSIS — J11.1: ICD-10-CM

## 2020-02-24 DIAGNOSIS — R50.9: Primary | ICD-10-CM

## 2020-02-24 DIAGNOSIS — G47.33: ICD-10-CM

## 2020-02-24 LAB
ALBUMIN SERPL-MCNC: 3.8 G/DL (ref 3.5–5)
ALT SERPL-CCNC: 12 U/L (ref 0–55)
AST SERPL-CCNC: 9 U/L (ref 5–34)
BASOPHILS # BLD: 0 10*3/UL (ref 0.02–0.1)
BILIRUB SERPL-MCNC: 0.9 MG/DL (ref 0.2–1.2)
CALCIUM SERPL-MCNC: 9.7 MG/DL (ref 8.3–10.5)
CO2 SERPL-SCNC: 29 MMOL/L (ref 22–29)
EOSINOPHIL # BLD: 0.1 10*3/UL (ref 0.04–0.4)
EOSINOPHIL NFR BLD: 1.6 % (ref 1–5)
ERYTHROCYTE [DISTWIDTH] IN BLOOD BY AUTOMATED COUNT: 16.2 % (ref 11.5–14.5)
GLUCOSE SERPL-MCNC: 100 MG/DL (ref 65–105)
HCT VFR BLD AUTO: 38.9 % (ref 37–47)
HGB BLD-MCNC: 11.4 G/DL (ref 12.5–16)
LYMPHOCYTES # BLD: 0.6 10*3/UL (ref 1.5–4)
MCH RBC QN AUTO: 28 PG (ref 27–31)
MCHC RBC AUTO-ENTMCNC: 29 G/DL (ref 33–37)
MCV RBC AUTO: 95 FL (ref 78–100)
MONOCYTES # BLD: 0.4 10*3/UL (ref 0.2–0.8)
NEUTROPHILS # BLD: 2.6 10*3/UL (ref 1.4–6.5)
PLATELET # BLD AUTO: 180 K/MM3 (ref 130–400)
PMV BLD AUTO: 10.6 FL (ref 7.4–10.4)
POTASSIUM SERPL-SCNC: 4 MMOL/L (ref 3.5–5.1)
PROT SERPL-MCNC: 6.8 G/DL (ref 6.4–8.3)
RBC # BLD AUTO: 4.11 M/MM3 (ref 4.1–5.3)
SODIUM SERPL-SCNC: 138 MMOL/L (ref 136–145)
WBC # BLD AUTO: 3.8 K/MM3 (ref 4.8–10.8)

## 2020-02-24 PROCEDURE — G0378 HOSPITAL OBSERVATION PER HR: HCPCS

## 2020-02-25 VITALS — DIASTOLIC BLOOD PRESSURE: 75 MMHG | SYSTOLIC BLOOD PRESSURE: 151 MMHG

## 2020-02-25 VITALS — DIASTOLIC BLOOD PRESSURE: 59 MMHG | SYSTOLIC BLOOD PRESSURE: 112 MMHG

## 2020-02-25 VITALS — DIASTOLIC BLOOD PRESSURE: 84 MMHG | SYSTOLIC BLOOD PRESSURE: 151 MMHG

## 2020-02-25 VITALS — SYSTOLIC BLOOD PRESSURE: 122 MMHG | DIASTOLIC BLOOD PRESSURE: 67 MMHG

## 2020-02-25 VITALS — DIASTOLIC BLOOD PRESSURE: 51 MMHG | SYSTOLIC BLOOD PRESSURE: 109 MMHG

## 2020-02-25 VITALS — DIASTOLIC BLOOD PRESSURE: 53 MMHG | SYSTOLIC BLOOD PRESSURE: 124 MMHG

## 2020-02-25 LAB
CALCIUM SERPL-MCNC: 10.2 MG/DL (ref 8.3–10.5)
CO2 SERPL-SCNC: 26 MMOL/L (ref 22–29)
ERYTHROCYTE [DISTWIDTH] IN BLOOD BY AUTOMATED COUNT: 15.7 % (ref 11.5–14.5)
GLUCOSE SERPL-MCNC: 182 MG/DL (ref 65–105)
HCT VFR BLD AUTO: 42.2 % (ref 37–47)
HGB BLD-MCNC: 12.6 G/DL (ref 12.5–16)
LYMPHOCYTES NFR BLD MANUAL: 13 % (ref 20–51)
MCH RBC QN AUTO: 28 PG (ref 27–31)
MCHC RBC AUTO-ENTMCNC: 30 G/DL (ref 33–37)
MCV RBC AUTO: 94 FL (ref 78–100)
MONOCYTES NFR BLD: 4 % (ref 3–10)
NEUTS SEG NFR BLD MANUAL: 83 % (ref 42–75)
PLATELET # BLD AUTO: 207 K/MM3 (ref 130–400)
PMV BLD AUTO: 10.7 FL (ref 7.4–10.4)
POTASSIUM SERPL-SCNC: 4.1 MMOL/L (ref 3.5–5.1)
RBC # BLD AUTO: 4.49 M/MM3 (ref 4.1–5.3)
SODIUM SERPL-SCNC: 139 MMOL/L (ref 136–145)
WBC # BLD AUTO: 4.1 K/MM3 (ref 4.8–10.8)

## 2020-02-26 VITALS — SYSTOLIC BLOOD PRESSURE: 145 MMHG | DIASTOLIC BLOOD PRESSURE: 63 MMHG

## 2020-02-26 VITALS — DIASTOLIC BLOOD PRESSURE: 70 MMHG | SYSTOLIC BLOOD PRESSURE: 143 MMHG

## 2020-02-26 VITALS — SYSTOLIC BLOOD PRESSURE: 127 MMHG | DIASTOLIC BLOOD PRESSURE: 54 MMHG

## 2020-04-07 VITALS
DIASTOLIC BLOOD PRESSURE: 68 MMHG | SYSTOLIC BLOOD PRESSURE: 136 MMHG | DIASTOLIC BLOOD PRESSURE: 68 MMHG | SYSTOLIC BLOOD PRESSURE: 136 MMHG

## 2020-04-27 ENCOUNTER — HOSPITAL ENCOUNTER (OUTPATIENT)
Dept: HOSPITAL 6 - LAB | Age: 60
End: 2020-04-27
Attending: INTERNAL MEDICINE
Payer: COMMERCIAL

## 2020-04-27 DIAGNOSIS — I47.1: ICD-10-CM

## 2020-04-27 DIAGNOSIS — R73.02: ICD-10-CM

## 2020-04-27 DIAGNOSIS — K90.9: ICD-10-CM

## 2020-04-27 DIAGNOSIS — I25.10: ICD-10-CM

## 2020-04-27 DIAGNOSIS — Z12.11: Primary | ICD-10-CM

## 2020-04-27 LAB
ALBUMIN SERPL-MCNC: 4.2 G/DL (ref 3.5–5)
ALT SERPL-CCNC: 15 U/L (ref 0–55)
AST SERPL-CCNC: 7 U/L (ref 5–34)
BILIRUB SERPL-MCNC: 1 MG/DL (ref 0.2–1.2)
CALCIUM SERPL-MCNC: 10.2 MG/DL (ref 8.3–10.5)
CO2 SERPL-SCNC: 31 MMOL/L (ref 22–29)
ERYTHROCYTE [DISTWIDTH] IN BLOOD BY AUTOMATED COUNT: 17.3 % (ref 11.5–14.5)
ERYTHROCYTE [SEDIMENTATION RATE] IN BLOOD: 4 MM/HR (ref 0–30)
GLUCOSE SERPL-MCNC: 107 MG/DL (ref 65–105)
HCT VFR BLD AUTO: 42.1 % (ref 37–47)
HGB BLD-MCNC: 13.1 G/DL (ref 12.5–16)
LYMPHOCYTES NFR BLD MANUAL: 11 % (ref 20–51)
MAGNESIUM SERPL-MCNC: 2.19 MG/DL (ref 1.6–2.6)
MCH RBC QN AUTO: 30 PG (ref 27–31)
MCHC RBC AUTO-ENTMCNC: 31 G/DL (ref 33–37)
MCV RBC AUTO: 95 FL (ref 78–100)
MONOCYTES NFR BLD: 4 % (ref 3–10)
NEUTS SEG NFR BLD MANUAL: 84 % (ref 42–75)
OVALOCYTES BLD QL SMEAR: (no result)
PLATELET # BLD AUTO: 214 K/MM3 (ref 130–400)
PMV BLD AUTO: 10.3 FL (ref 7.4–10.4)
POTASSIUM SERPL-SCNC: 4.6 MMOL/L (ref 3.5–5.1)
PROT SERPL-MCNC: 6.9 G/DL (ref 6.4–8.3)
RBC # BLD AUTO: 4.44 M/MM3 (ref 4.1–5.3)
SODIUM SERPL-SCNC: 140 MMOL/L (ref 136–145)
WBC # BLD AUTO: 12.2 K/MM3 (ref 4.8–10.8)

## 2020-05-08 ENCOUNTER — HOSPITAL ENCOUNTER (OUTPATIENT)
Dept: HOSPITAL 6 - MAMMO | Age: 60
End: 2020-05-08
Attending: INTERNAL MEDICINE
Payer: COMMERCIAL

## 2020-05-08 DIAGNOSIS — Z12.31: Primary | ICD-10-CM

## 2020-05-08 DIAGNOSIS — M81.0: ICD-10-CM

## 2020-05-08 DIAGNOSIS — E01.0: ICD-10-CM

## 2020-05-08 DIAGNOSIS — E05.90: ICD-10-CM

## 2020-05-08 DIAGNOSIS — M85.80: ICD-10-CM

## 2020-06-15 ENCOUNTER — HOSPITAL ENCOUNTER (OUTPATIENT)
Dept: HOSPITAL 19 - COL.PUL | Age: 60
End: 2020-06-15
Attending: INTERNAL MEDICINE
Payer: COMMERCIAL

## 2020-06-15 DIAGNOSIS — J96.12: Primary | ICD-10-CM

## 2020-06-15 LAB
BASE EXCESS BLDA CALC-SCNC: 5 MMOL/L (ref -2–2)
CO2 BLDA-SCNC: 30.8 MMOL/L
HCO3 BLDA-SCNC: 29.5 MEQ/L (ref 22–26)
PCO2 BLDA: 42.9 MMHG (ref 35–45)
PO2 BLDA: 81.9 MMHG (ref 80–100)
SAO2 % BLDA: 96.6 % (ref 92–100)

## 2020-06-29 ENCOUNTER — HOSPITAL ENCOUNTER (OUTPATIENT)
Dept: HOSPITAL 6 - ED | Age: 60
Setting detail: OBSERVATION
LOS: 2 days | Discharge: HOME | End: 2020-07-01
Attending: INTERNAL MEDICINE | Admitting: NURSE PRACTITIONER
Payer: COMMERCIAL

## 2020-06-29 VITALS — SYSTOLIC BLOOD PRESSURE: 146 MMHG | DIASTOLIC BLOOD PRESSURE: 73 MMHG

## 2020-06-29 VITALS — SYSTOLIC BLOOD PRESSURE: 141 MMHG | DIASTOLIC BLOOD PRESSURE: 80 MMHG

## 2020-06-29 VITALS — BODY MASS INDEX: 48.82 KG/M2 | WEIGHT: 293 LBS | HEIGHT: 65 IN

## 2020-06-29 VITALS — DIASTOLIC BLOOD PRESSURE: 73 MMHG | SYSTOLIC BLOOD PRESSURE: 146 MMHG

## 2020-06-29 DIAGNOSIS — E87.6: ICD-10-CM

## 2020-06-29 DIAGNOSIS — J96.10: ICD-10-CM

## 2020-06-29 DIAGNOSIS — E66.9: ICD-10-CM

## 2020-06-29 DIAGNOSIS — G47.33: ICD-10-CM

## 2020-06-29 DIAGNOSIS — Z95.5: ICD-10-CM

## 2020-06-29 DIAGNOSIS — R60.0: Primary | ICD-10-CM

## 2020-06-29 DIAGNOSIS — Z79.899: ICD-10-CM

## 2020-06-29 DIAGNOSIS — Z79.82: ICD-10-CM

## 2020-06-29 DIAGNOSIS — I10: ICD-10-CM

## 2020-06-29 DIAGNOSIS — I25.10: ICD-10-CM

## 2020-06-29 DIAGNOSIS — I47.1: ICD-10-CM

## 2020-06-29 DIAGNOSIS — J44.9: ICD-10-CM

## 2020-06-29 DIAGNOSIS — F17.210: ICD-10-CM

## 2020-06-29 LAB
ALBUMIN SERPL-MCNC: 4.4 G/DL (ref 3.5–5)
ALT SERPL-CCNC: 11 U/L (ref 0–55)
AST SERPL-CCNC: 11 U/L (ref 5–34)
BASOPHILS # BLD: 0 10*3/UL (ref 0.02–0.1)
BILIRUB SERPL-MCNC: 0.8 MG/DL (ref 0.2–1.2)
CALCIUM SERPL-MCNC: 10.8 MG/DL (ref 8.3–10.5)
CO2 SERPL-SCNC: 38 MMOL/L (ref 22–29)
EOSINOPHIL # BLD: 0.3 10*3/UL (ref 0.04–0.4)
EOSINOPHIL NFR BLD: 3.8 % (ref 1–5)
ERYTHROCYTE [DISTWIDTH] IN BLOOD BY AUTOMATED COUNT: 15 % (ref 11.5–14.5)
GLUCOSE SERPL-MCNC: 123 MG/DL (ref 65–105)
HCT VFR BLD AUTO: 42 % (ref 37–47)
HGB BLD-MCNC: 12.8 G/DL (ref 12.5–16)
LYMPHOCYTES # BLD: 1.3 10*3/UL (ref 1.5–4)
MCH RBC QN AUTO: 29 PG (ref 27–31)
MCHC RBC AUTO-ENTMCNC: 31 G/DL (ref 33–37)
MCV RBC AUTO: 94 FL (ref 78–100)
MONOCYTES # BLD: 0.7 10*3/UL (ref 0.2–0.8)
NEUTROPHILS # BLD: 5.4 10*3/UL (ref 1.4–6.5)
PLATELET # BLD AUTO: 257 K/MM3 (ref 130–400)
PMV BLD AUTO: 10.9 FL (ref 7.4–10.4)
POTASSIUM SERPL-SCNC: 2.8 MMOL/L (ref 3.5–5.1)
PROT SERPL-MCNC: 7.8 G/DL (ref 6.4–8.3)
RBC # BLD AUTO: 4.45 M/MM3 (ref 4.1–5.3)
SODIUM SERPL-SCNC: 139 MMOL/L (ref 136–145)
WBC # BLD AUTO: 7.7 K/MM3 (ref 4.8–10.8)

## 2020-06-29 PROCEDURE — G0378 HOSPITAL OBSERVATION PER HR: HCPCS

## 2020-06-30 VITALS — SYSTOLIC BLOOD PRESSURE: 124 MMHG | DIASTOLIC BLOOD PRESSURE: 75 MMHG

## 2020-06-30 VITALS — DIASTOLIC BLOOD PRESSURE: 73 MMHG | SYSTOLIC BLOOD PRESSURE: 147 MMHG

## 2020-06-30 VITALS — SYSTOLIC BLOOD PRESSURE: 135 MMHG | DIASTOLIC BLOOD PRESSURE: 73 MMHG

## 2020-06-30 VITALS — SYSTOLIC BLOOD PRESSURE: 96 MMHG | DIASTOLIC BLOOD PRESSURE: 62 MMHG

## 2020-06-30 VITALS — SYSTOLIC BLOOD PRESSURE: 120 MMHG | DIASTOLIC BLOOD PRESSURE: 51 MMHG

## 2020-06-30 VITALS — SYSTOLIC BLOOD PRESSURE: 137 MMHG | DIASTOLIC BLOOD PRESSURE: 70 MMHG

## 2020-06-30 LAB
CALCIUM SERPL-MCNC: 9.9 MG/DL (ref 8.3–10.5)
CO2 SERPL-SCNC: 41 MMOL/L (ref 22–29)
GLUCOSE SERPL-MCNC: 139 MG/DL (ref 65–105)
MAGNESIUM SERPL-MCNC: 1.72 MG/DL (ref 1.6–2.6)
POTASSIUM SERPL-SCNC: 3 MMOL/L (ref 3.5–5.1)
SODIUM SERPL-SCNC: 140 MMOL/L (ref 136–145)

## 2020-07-01 VITALS
SYSTOLIC BLOOD PRESSURE: 147 MMHG | SYSTOLIC BLOOD PRESSURE: 147 MMHG | SYSTOLIC BLOOD PRESSURE: 147 MMHG | DIASTOLIC BLOOD PRESSURE: 66 MMHG | DIASTOLIC BLOOD PRESSURE: 66 MMHG | SYSTOLIC BLOOD PRESSURE: 147 MMHG | DIASTOLIC BLOOD PRESSURE: 66 MMHG | DIASTOLIC BLOOD PRESSURE: 66 MMHG | SYSTOLIC BLOOD PRESSURE: 147 MMHG | DIASTOLIC BLOOD PRESSURE: 66 MMHG

## 2020-07-01 VITALS — SYSTOLIC BLOOD PRESSURE: 147 MMHG | DIASTOLIC BLOOD PRESSURE: 72 MMHG

## 2020-07-01 VITALS — SYSTOLIC BLOOD PRESSURE: 135 MMHG | DIASTOLIC BLOOD PRESSURE: 68 MMHG

## 2020-07-01 VITALS — DIASTOLIC BLOOD PRESSURE: 62 MMHG | SYSTOLIC BLOOD PRESSURE: 132 MMHG

## 2020-07-01 LAB
CALCIUM SERPL-MCNC: 10.4 MG/DL (ref 8.3–10.5)
CO2 SERPL-SCNC: 40 MMOL/L (ref 22–29)
CO2 SERPL-SCNC: 42 MMOL/L (ref 22–29)
GLUCOSE SERPL-MCNC: 112 MG/DL (ref 65–105)
POTASSIUM SERPL-SCNC: 2.6 MMOL/L (ref 3.5–5.1)
POTASSIUM SERPL-SCNC: 3.2 MMOL/L (ref 3.5–5.1)
SODIUM SERPL-SCNC: 138 MMOL/L (ref 136–145)
SODIUM SERPL-SCNC: 139 MMOL/L (ref 136–145)

## 2020-07-07 ENCOUNTER — HOSPITAL ENCOUNTER (OUTPATIENT)
Dept: HOSPITAL 6 - LAB | Age: 60
End: 2020-07-07
Attending: PHYSICIAN ASSISTANT
Payer: COMMERCIAL

## 2020-07-07 DIAGNOSIS — R60.0: ICD-10-CM

## 2020-07-07 DIAGNOSIS — I47.1: ICD-10-CM

## 2020-07-07 DIAGNOSIS — K90.9: ICD-10-CM

## 2020-07-07 DIAGNOSIS — I25.10: Primary | ICD-10-CM

## 2020-07-07 DIAGNOSIS — E87.6: ICD-10-CM

## 2020-07-07 LAB
ALBUMIN SERPL-MCNC: 4.3 G/DL (ref 3.5–5)
ALT SERPL-CCNC: 13 U/L (ref 0–55)
AST SERPL-CCNC: 10 U/L (ref 5–34)
BASOPHILS # BLD: 0 10*3/UL (ref 0.02–0.1)
BILIRUB SERPL-MCNC: 0.8 MG/DL (ref 0.2–1.2)
CALCIUM SERPL-MCNC: 10.4 MG/DL (ref 8.3–10.5)
CO2 SERPL-SCNC: 33 MMOL/L (ref 22–29)
EOSINOPHIL # BLD: 0.3 10*3/UL (ref 0.04–0.4)
EOSINOPHIL NFR BLD: 4.2 % (ref 1–5)
ERYTHROCYTE [DISTWIDTH] IN BLOOD BY AUTOMATED COUNT: 14.9 % (ref 11.5–14.5)
GLUCOSE SERPL-MCNC: 132 MG/DL (ref 65–105)
HCT VFR BLD AUTO: 42.6 % (ref 37–47)
HGB BLD-MCNC: 12.9 G/DL (ref 12.5–16)
LYMPHOCYTES # BLD: 1.3 10*3/UL (ref 1.5–4)
MAGNESIUM SERPL-MCNC: 1.74 MG/DL (ref 1.6–2.6)
MCH RBC QN AUTO: 29 PG (ref 27–31)
MCHC RBC AUTO-ENTMCNC: 30 G/DL (ref 33–37)
MCV RBC AUTO: 94 FL (ref 78–100)
MONOCYTES # BLD: 0.6 10*3/UL (ref 0.2–0.8)
NEUTROPHILS # BLD: 5 10*3/UL (ref 1.4–6.5)
PLATELET # BLD AUTO: 257 K/MM3 (ref 130–400)
PMV BLD AUTO: 11.2 FL (ref 7.4–10.4)
POTASSIUM SERPL-SCNC: 3.4 MMOL/L (ref 3.5–5.1)
PROT SERPL-MCNC: 8.1 G/DL (ref 6.4–8.3)
RBC # BLD AUTO: 4.52 M/MM3 (ref 4.1–5.3)
SODIUM SERPL-SCNC: 140 MMOL/L (ref 136–145)
WBC # BLD AUTO: 7.3 K/MM3 (ref 4.8–10.8)

## 2020-07-14 ENCOUNTER — HOSPITAL ENCOUNTER (OUTPATIENT)
Dept: HOSPITAL 6 - LAB | Age: 60
End: 2020-07-14
Attending: INTERNAL MEDICINE
Payer: COMMERCIAL

## 2020-07-14 DIAGNOSIS — I10: ICD-10-CM

## 2020-07-14 DIAGNOSIS — I47.1: Primary | ICD-10-CM

## 2020-07-14 LAB
CALCIUM SERPL-MCNC: 10.5 MG/DL (ref 8.3–10.5)
CO2 SERPL-SCNC: 28 MMOL/L (ref 22–29)
GLUCOSE SERPL-MCNC: 124 MG/DL (ref 65–105)
MAGNESIUM SERPL-MCNC: 1.77 MG/DL (ref 1.6–2.6)
POTASSIUM SERPL-SCNC: 4.4 MMOL/L (ref 3.5–5.1)
SODIUM SERPL-SCNC: 139 MMOL/L (ref 136–145)

## 2020-08-03 ENCOUNTER — HOSPITAL ENCOUNTER (OUTPATIENT)
Dept: HOSPITAL 6 - LAB | Age: 60
End: 2020-08-03
Attending: INTERNAL MEDICINE
Payer: COMMERCIAL

## 2020-08-03 DIAGNOSIS — I10: Primary | ICD-10-CM

## 2020-08-03 LAB
CALCIUM SERPL-MCNC: 10.4 MG/DL (ref 8.3–10.5)
CO2 SERPL-SCNC: 28 MMOL/L (ref 22–29)
GLUCOSE SERPL-MCNC: 118 MG/DL (ref 65–105)
MAGNESIUM SERPL-MCNC: 1.95 MG/DL (ref 1.6–2.6)
POTASSIUM SERPL-SCNC: 4.6 MMOL/L (ref 3.5–5.1)
SODIUM SERPL-SCNC: 139 MMOL/L (ref 136–145)

## 2020-10-13 ENCOUNTER — HOSPITAL ENCOUNTER (OUTPATIENT)
Dept: HOSPITAL 6 - RAD | Age: 60
End: 2020-10-13
Attending: INTERNAL MEDICINE
Payer: COMMERCIAL

## 2020-10-13 DIAGNOSIS — I47.1: ICD-10-CM

## 2020-10-13 DIAGNOSIS — R73.02: ICD-10-CM

## 2020-10-13 DIAGNOSIS — S52.302A: Primary | ICD-10-CM

## 2020-10-13 DIAGNOSIS — K90.9: ICD-10-CM

## 2020-10-13 DIAGNOSIS — I25.10: ICD-10-CM

## 2020-10-13 LAB
ALBUMIN SERPL-MCNC: 4.4 G/DL (ref 3.5–5)
ALT SERPL-CCNC: 8 U/L (ref 0–55)
AST SERPL-CCNC: 8 U/L (ref 5–34)
BASOPHILS # BLD: 0 10*3/UL (ref 0.02–0.1)
BILIRUB SERPL-MCNC: 0.6 MG/DL (ref 0.2–1.2)
CALCIUM SERPL-MCNC: 10.1 MG/DL (ref 8.3–10.5)
CO2 SERPL-SCNC: 27 MMOL/L (ref 22–29)
EOSINOPHIL # BLD: 0.3 10*3/UL (ref 0.04–0.4)
EOSINOPHIL NFR BLD: 4.3 % (ref 1–5)
ERYTHROCYTE [DISTWIDTH] IN BLOOD BY AUTOMATED COUNT: 15.8 % (ref 11.5–14.5)
GLUCOSE SERPL-MCNC: 122 MG/DL (ref 65–105)
HCT VFR BLD AUTO: 41.6 % (ref 37–47)
HGB BLD-MCNC: 12.9 G/DL (ref 12.5–16)
LYMPHOCYTES # BLD: 1.4 10*3/UL (ref 1.5–4)
MAGNESIUM SERPL-MCNC: 2 MG/DL (ref 1.6–2.6)
MCH RBC QN AUTO: 30 PG (ref 27–31)
MCHC RBC AUTO-ENTMCNC: 31 G/DL (ref 33–37)
MCV RBC AUTO: 96 FL (ref 78–100)
MONOCYTES # BLD: 0.6 10*3/UL (ref 0.2–0.8)
NEUTROPHILS # BLD: 5.4 10*3/UL (ref 1.4–6.5)
PLATELET # BLD AUTO: 240 K/MM3 (ref 130–400)
PMV BLD AUTO: 10.7 FL (ref 7.4–10.4)
POTASSIUM SERPL-SCNC: 4.3 MMOL/L (ref 3.5–5.1)
PROT SERPL-MCNC: 7.8 G/DL (ref 6.4–8.3)
RBC # BLD AUTO: 4.35 M/MM3 (ref 4.1–5.3)
SODIUM SERPL-SCNC: 140 MMOL/L (ref 136–145)
WBC # BLD AUTO: 7.8 K/MM3 (ref 4.8–10.8)

## 2020-11-28 ENCOUNTER — HOSPITAL ENCOUNTER (EMERGENCY)
Dept: HOSPITAL 6 - ED | Age: 60
LOS: 1 days | Discharge: HOME | End: 2020-11-29
Payer: COMMERCIAL

## 2020-11-28 VITALS — BODY MASS INDEX: 50.02 KG/M2 | HEIGHT: 64.02 IN | WEIGHT: 293 LBS

## 2020-11-28 DIAGNOSIS — Z79.82: ICD-10-CM

## 2020-11-28 DIAGNOSIS — R07.9: Primary | ICD-10-CM

## 2020-11-28 DIAGNOSIS — G47.30: ICD-10-CM

## 2020-11-28 DIAGNOSIS — K21.9: ICD-10-CM

## 2020-11-28 DIAGNOSIS — I50.9: ICD-10-CM

## 2020-11-28 DIAGNOSIS — Z95.9: ICD-10-CM

## 2020-11-28 DIAGNOSIS — Z87.891: ICD-10-CM

## 2020-11-28 DIAGNOSIS — E78.5: ICD-10-CM

## 2020-11-28 DIAGNOSIS — Z79.02: ICD-10-CM

## 2020-11-28 DIAGNOSIS — I10: ICD-10-CM

## 2020-11-28 DIAGNOSIS — E66.01: ICD-10-CM

## 2020-11-28 DIAGNOSIS — I25.10: ICD-10-CM

## 2020-11-28 LAB
ALBUMIN SERPL-MCNC: 4 G/DL (ref 3.5–5)
ALT SERPL-CCNC: 10 U/L (ref 0–55)
APTT PPP: 22.3 SECONDS (ref 21–32)
AST SERPL-CCNC: 8 U/L (ref 5–34)
BASOPHILS # BLD: 0 10*3/UL (ref 0.02–0.1)
BILIRUB SERPL-MCNC: 0.9 MG/DL (ref 0.2–1.2)
CALCIUM SERPL-MCNC: 9.5 MG/DL (ref 8.3–10.5)
CO2 SERPL-SCNC: 31 MMOL/L (ref 22–29)
EOSINOPHIL # BLD: 0.3 10*3/UL (ref 0.04–0.4)
EOSINOPHIL NFR BLD: 3.2 % (ref 1–5)
ERYTHROCYTE [DISTWIDTH] IN BLOOD BY AUTOMATED COUNT: 14.8 % (ref 11.5–14.5)
GLUCOSE SERPL-MCNC: 132 MG/DL (ref 65–105)
HCT VFR BLD AUTO: 38.9 % (ref 37–47)
HGB BLD-MCNC: 11.7 G/DL (ref 12.5–16)
LYMPHOCYTES # BLD: 1.2 10*3/UL (ref 1.5–4)
MCH RBC QN AUTO: 29 PG (ref 27–31)
MCHC RBC AUTO-ENTMCNC: 30 G/DL (ref 33–37)
MCV RBC AUTO: 97 FL (ref 78–100)
MONOCYTES # BLD: 0.7 10*3/UL (ref 0.2–0.8)
NEUTROPHILS # BLD: 6.4 10*3/UL (ref 1.4–6.5)
PLATELET # BLD AUTO: 208 K/MM3 (ref 130–400)
PMV BLD AUTO: 10.7 FL (ref 7.4–10.4)
POTASSIUM SERPL-SCNC: 4.1 MMOL/L (ref 3.5–5.1)
PROT SERPL-MCNC: 6.8 G/DL (ref 6.4–8.3)
PROTHROMBIN TIME: 10.6 SECONDS (ref 9–12)
RBC # BLD AUTO: 4 M/MM3 (ref 4.1–5.3)
SODIUM SERPL-SCNC: 139 MMOL/L (ref 136–145)
WBC # BLD AUTO: 8.7 K/MM3 (ref 4.8–10.8)

## 2020-11-29 VITALS — SYSTOLIC BLOOD PRESSURE: 103 MMHG | DIASTOLIC BLOOD PRESSURE: 55 MMHG

## 2020-12-29 ENCOUNTER — HOSPITAL ENCOUNTER (EMERGENCY)
Dept: HOSPITAL 6 - ED | Age: 60
Discharge: HOME | End: 2020-12-29
Payer: COMMERCIAL

## 2020-12-29 VITALS — HEIGHT: 55 IN | WEIGHT: 293 LBS

## 2020-12-29 VITALS
DIASTOLIC BLOOD PRESSURE: 75 MMHG | SYSTOLIC BLOOD PRESSURE: 132 MMHG | DIASTOLIC BLOOD PRESSURE: 75 MMHG | SYSTOLIC BLOOD PRESSURE: 132 MMHG

## 2020-12-29 DIAGNOSIS — Z79.02: ICD-10-CM

## 2020-12-29 DIAGNOSIS — Z79.82: ICD-10-CM

## 2020-12-29 DIAGNOSIS — I25.10: ICD-10-CM

## 2020-12-29 DIAGNOSIS — K21.9: ICD-10-CM

## 2020-12-29 DIAGNOSIS — Z20.828: ICD-10-CM

## 2020-12-29 DIAGNOSIS — R19.7: Primary | ICD-10-CM

## 2020-12-29 DIAGNOSIS — Z87.891: ICD-10-CM

## 2020-12-29 DIAGNOSIS — Z90.710: ICD-10-CM

## 2020-12-29 DIAGNOSIS — R10.84: ICD-10-CM

## 2020-12-29 DIAGNOSIS — I10: ICD-10-CM

## 2020-12-29 DIAGNOSIS — R11.2: ICD-10-CM

## 2020-12-29 DIAGNOSIS — Z90.49: ICD-10-CM

## 2020-12-29 LAB
ALBUMIN SERPL-MCNC: 4 G/DL (ref 3.5–5)
ALT SERPL-CCNC: 9 U/L (ref 0–55)
APPEARANCE UR: CLEAR
AST SERPL-CCNC: 7 U/L (ref 5–34)
BILIRUB SERPL-MCNC: 0.7 MG/DL (ref 0.2–1.2)
BILIRUB UR QL STRIP.AUTO: NEGATIVE
CALCIUM SERPL-MCNC: 9.8 MG/DL (ref 8.3–10.5)
CO2 SERPL-SCNC: 34 MMOL/L (ref 22–29)
COLOR UR AUTO: YELLOW
ERYTHROCYTE [DISTWIDTH] IN BLOOD BY AUTOMATED COUNT: 14.7 % (ref 11.5–14.5)
GLUCOSE SERPL-MCNC: 137 MG/DL (ref 65–105)
GLUCOSE UR QL STRIP.AUTO: NEGATIVE MG/DL
HCT VFR BLD AUTO: 39 % (ref 37–47)
HGB BLD-MCNC: 11.6 G/DL (ref 12.5–16)
KETONES UR QL STRIP.AUTO: NEGATIVE
LEUKOCYTE ESTERASE UR QL STRIP: NEGATIVE
LIPASE SERPL-CCNC: 14 U/L (ref 8–78)
LYMPHOCYTES NFR BLD MANUAL: 12 % (ref 20–51)
MCH RBC QN AUTO: 29 PG (ref 27–31)
MCHC RBC AUTO-ENTMCNC: 30 G/DL (ref 33–37)
MCV RBC AUTO: 97 FL (ref 78–100)
MONOCYTES NFR BLD: 4 % (ref 3–10)
NEUTS BAND NFR BLD: 2 % (ref 0–10)
NEUTS SEG NFR BLD MANUAL: 79 % (ref 42–75)
NITRITE UR QL STRIP: NEGATIVE
PH UR STRIP.AUTO: 5 [PH] (ref 5–8)
PLATELET # BLD AUTO: 226 K/MM3 (ref 130–400)
PMV BLD AUTO: 10.7 FL (ref 7.4–10.4)
POTASSIUM SERPL-SCNC: 4.4 MMOL/L (ref 3.5–5.1)
PROT ?TM UR-MCNC: (no result) MG/DL
PROT SERPL-MCNC: 6.8 G/DL (ref 6.4–8.3)
RBC # BLD AUTO: 4.01 M/MM3 (ref 4.1–5.3)
RBC UR QL AUTO: NEGATIVE
SODIUM SERPL-SCNC: 140 MMOL/L (ref 136–145)
SP GR UR STRIP.AUTO: 1.01 (ref 1–1.03)
TROPONIN I SERPL-MCNC: < 0.03 NG/ML (ref ?–0.03)
UROBILINOGEN UR-MCNC: NORMAL MG/DL
WBC # BLD AUTO: 8.7 K/MM3 (ref 4.8–10.8)
WBC #/AREA URNS HPF: (no result) /HPF (ref 0–3)

## 2021-01-18 ENCOUNTER — HOSPITAL ENCOUNTER (OUTPATIENT)
Dept: HOSPITAL 6 - LAB | Age: 61
End: 2021-01-18
Payer: COMMERCIAL

## 2021-01-18 DIAGNOSIS — Z01.812: Primary | ICD-10-CM

## 2021-01-18 DIAGNOSIS — Z20.828: ICD-10-CM

## 2021-06-01 ENCOUNTER — HOSPITAL ENCOUNTER (OUTPATIENT)
Dept: HOSPITAL 19 - COL.CAR | Age: 61
Discharge: HOME | End: 2021-06-01
Attending: INTERNAL MEDICINE
Payer: COMMERCIAL

## 2021-06-01 VITALS — SYSTOLIC BLOOD PRESSURE: 117 MMHG | DIASTOLIC BLOOD PRESSURE: 65 MMHG | HEART RATE: 57 BPM

## 2021-06-01 VITALS — DIASTOLIC BLOOD PRESSURE: 68 MMHG | HEART RATE: 56 BPM | SYSTOLIC BLOOD PRESSURE: 101 MMHG

## 2021-06-01 VITALS — SYSTOLIC BLOOD PRESSURE: 131 MMHG | DIASTOLIC BLOOD PRESSURE: 70 MMHG | HEART RATE: 59 BPM

## 2021-06-01 VITALS — WEIGHT: 260.81 LBS | HEIGHT: 64.02 IN | BODY MASS INDEX: 44.53 KG/M2

## 2021-06-01 VITALS — TEMPERATURE: 98.2 F | DIASTOLIC BLOOD PRESSURE: 76 MMHG | HEART RATE: 54 BPM | SYSTOLIC BLOOD PRESSURE: 120 MMHG

## 2021-06-01 VITALS — HEART RATE: 58 BPM | DIASTOLIC BLOOD PRESSURE: 66 MMHG | SYSTOLIC BLOOD PRESSURE: 107 MMHG

## 2021-06-01 VITALS — SYSTOLIC BLOOD PRESSURE: 113 MMHG | HEART RATE: 54 BPM | DIASTOLIC BLOOD PRESSURE: 69 MMHG

## 2021-06-01 VITALS — HEART RATE: 66 BPM | DIASTOLIC BLOOD PRESSURE: 69 MMHG | SYSTOLIC BLOOD PRESSURE: 100 MMHG

## 2021-06-01 VITALS — HEART RATE: 55 BPM | SYSTOLIC BLOOD PRESSURE: 121 MMHG | DIASTOLIC BLOOD PRESSURE: 65 MMHG

## 2021-06-01 VITALS — HEART RATE: 66 BPM | SYSTOLIC BLOOD PRESSURE: 112 MMHG | DIASTOLIC BLOOD PRESSURE: 67 MMHG

## 2021-06-01 DIAGNOSIS — Z95.5: ICD-10-CM

## 2021-06-01 DIAGNOSIS — Z79.02: ICD-10-CM

## 2021-06-01 DIAGNOSIS — I71.2: ICD-10-CM

## 2021-06-01 DIAGNOSIS — Z20.822: ICD-10-CM

## 2021-06-01 DIAGNOSIS — I25.10: Primary | ICD-10-CM

## 2021-06-01 DIAGNOSIS — E66.01: ICD-10-CM

## 2021-06-01 DIAGNOSIS — I10: ICD-10-CM

## 2021-06-01 DIAGNOSIS — Z99.81: ICD-10-CM

## 2021-06-01 DIAGNOSIS — Z79.82: ICD-10-CM

## 2021-06-01 DIAGNOSIS — Z79.899: ICD-10-CM

## 2021-06-01 DIAGNOSIS — J44.9: ICD-10-CM

## 2021-06-01 DIAGNOSIS — E78.5: ICD-10-CM

## 2021-06-01 LAB
ANION GAP SERPL CALC-SCNC: 3 MMOL/L (ref 7–16)
APTT PPP: 26.8 SECONDS (ref 26–37)
BUN SERPL-MCNC: 14 MG/DL (ref 7–17)
CALCIUM SERPL-MCNC: 10.6 MG/DL (ref 8.4–10.2)
CHLORIDE SERPL-SCNC: 99 MMOL/L (ref 98–107)
CO2 SERPL-SCNC: 34 MMOL/L (ref 22–30)
CREAT SERPL-SCNC: 0.86 UMOL/L (ref 0.52–1.25)
ERYTHROCYTE [DISTWIDTH] IN BLOOD BY AUTOMATED COUNT: 15.3 % (ref 11.5–14.5)
GLUCOSE SERPL-MCNC: 149 MG/DL (ref 74–106)
HCT VFR BLD AUTO: 37.2 % (ref 37–47)
HGB BLD-MCNC: 11.4 G/DL (ref 12.5–16)
INR BLD: 1.1 (ref 0.8–3)
MCH RBC QN AUTO: 28 PG (ref 27–31)
MCHC RBC AUTO-ENTMCNC: 31 G/DL (ref 33–37)
MCV RBC AUTO: 92 FL (ref 80–100)
PLATELET # BLD AUTO: 239 K/MM3 (ref 130–400)
PMV BLD AUTO: 10.7 FL (ref 7.4–10.4)
POTASSIUM SERPL-SCNC: 4.4 MMOL/L (ref 3.4–5)
PROTHROMBIN TIME: 12.4 SECONDS (ref 9.7–12.8)
RBC # BLD AUTO: 4.03 M/MM3 (ref 4.1–5.3)
SODIUM SERPL-SCNC: 135 MMOL/L (ref 137–145)

## 2021-06-01 PROCEDURE — C1769 GUIDE WIRE: HCPCS

## 2021-06-01 PROCEDURE — C1887 CATHETER, GUIDING: HCPCS

## 2021-06-01 NOTE — NUR
BACK FROM CATH LAB, PT IS DROWSY, AROUSABLE TO VOICE.  SB ON MONITOR RATE
50'S.  TR BAND TO RT WRIST, CMS INTACT.  PT AND DAUGHTER UPDATED ON POC.
SPRITE GIVEN.  NO PROBLEM DRINKING.  WCTM

## 2021-06-01 NOTE — NUR
PT READY TO GO AT 1520, TR BAND WAS DEFLATED WITH NO PROBLEM, SITE DRESSED AS
NORMAL WITH BANDAID, FOLDED 2X2 AND COBAN, CMS REMAINS INTACT DISTAL.  I
REVIEWED DC/FU AND RX INSTRUCTIONS WITH PT AND , NO QUESTIONS OR
CONCERNS AT TIME OF DEPARTURE.  IV WAS DC'D WITH CATH INTACT.  PT TO EXIT VIA
WHEELCHAIR AT 1547.

## 2021-06-16 ENCOUNTER — HOSPITAL ENCOUNTER (OUTPATIENT)
Dept: HOSPITAL 19 - COL.RAD | Age: 61
End: 2021-06-16
Attending: NURSE PRACTITIONER
Payer: COMMERCIAL

## 2021-06-16 DIAGNOSIS — J43.9: ICD-10-CM

## 2021-06-16 DIAGNOSIS — I71.2: Primary | ICD-10-CM

## 2021-08-26 ENCOUNTER — HOSPITAL ENCOUNTER (OUTPATIENT)
Dept: HOSPITAL 6 - RAD | Age: 61
End: 2021-08-26
Attending: INTERNAL MEDICINE
Payer: COMMERCIAL

## 2021-08-26 DIAGNOSIS — I10: ICD-10-CM

## 2021-08-26 DIAGNOSIS — I51.7: Primary | ICD-10-CM

## 2021-08-26 DIAGNOSIS — K90.9: ICD-10-CM

## 2021-08-26 LAB
ALBUMIN SERPL-MCNC: 4.2 G/DL (ref 3.5–5)
ALT SERPL-CCNC: 14 U/L (ref 0–55)
AST SERPL-CCNC: 11 U/L (ref 5–34)
BASOPHILS # BLD: 0.03 10*3/UL (ref 0.02–0.1)
BILIRUB SERPL-MCNC: 0.7 MG/DL (ref 0.2–1.2)
CALCIUM SERPL-MCNC: 10.9 MG/DL (ref 8.3–10.5)
CO2 SERPL-SCNC: 31 MMOL/L (ref 22–29)
D DIMER PPP FEU-MCNC: 0.81 MG/L FEU (ref 0.15–0.5)
EOSINOPHIL # BLD: 0.29 10*3/UL (ref 0.04–0.4)
EOSINOPHIL NFR BLD: 3.4 % (ref 1–5)
ERYTHROCYTE [DISTWIDTH] IN BLOOD BY AUTOMATED COUNT: 15.9 % (ref 11.5–14.5)
GLUCOSE SERPL-MCNC: 107 MG/DL (ref 65–105)
HCT VFR BLD AUTO: 42.4 % (ref 37–47)
HGB BLD-MCNC: 12.5 G/DL (ref 12.5–16)
LYMPHOCYTES # BLD: 1.65 10*3/UL (ref 1.5–4)
MAGNESIUM SERPL-MCNC: 1.99 MG/DL (ref 1.6–2.6)
MCH RBC QN AUTO: 29 PG (ref 27–31)
MCHC RBC AUTO-ENTMCNC: 30 G/DL (ref 33–37)
MCV RBC AUTO: 97 FL (ref 78–100)
MONOCYTES # BLD: 0.61 10*3/UL (ref 0.2–0.8)
NEUTROPHILS # BLD: 5.98 10*3/UL (ref 1.4–6.5)
PLATELET # BLD AUTO: 258 K/MM3 (ref 130–400)
PMV BLD AUTO: 10.8 FL (ref 7.4–10.4)
POTASSIUM SERPL-SCNC: 4.3 MMOL/L (ref 3.5–5.1)
PROT SERPL-MCNC: 8.1 G/DL (ref 6.4–8.3)
RBC # BLD AUTO: 4.38 M/MM3 (ref 4.1–5.3)
SODIUM SERPL-SCNC: 141 MMOL/L (ref 136–145)
WBC # BLD AUTO: 8.6 K/MM3 (ref 4.8–10.8)

## 2021-08-30 ENCOUNTER — HOSPITAL ENCOUNTER (OUTPATIENT)
Dept: HOSPITAL 6 - RAD | Age: 61
End: 2021-08-30
Attending: INTERNAL MEDICINE
Payer: COMMERCIAL

## 2021-08-30 DIAGNOSIS — I28.8: Primary | ICD-10-CM

## 2021-11-24 ENCOUNTER — HOSPITAL ENCOUNTER (OUTPATIENT)
Dept: HOSPITAL 6 - ED | Age: 61
Setting detail: OBSERVATION
LOS: 1 days | Discharge: HOME | End: 2021-11-25
Attending: FAMILY MEDICINE | Admitting: PHYSICIAN ASSISTANT
Payer: COMMERCIAL

## 2021-11-24 VITALS — DIASTOLIC BLOOD PRESSURE: 64 MMHG | SYSTOLIC BLOOD PRESSURE: 178 MMHG

## 2021-11-24 VITALS — BODY MASS INDEX: 50.02 KG/M2 | HEIGHT: 64.02 IN | WEIGHT: 293 LBS

## 2021-11-24 DIAGNOSIS — Z79.891: ICD-10-CM

## 2021-11-24 DIAGNOSIS — I10: ICD-10-CM

## 2021-11-24 DIAGNOSIS — I47.1: ICD-10-CM

## 2021-11-24 DIAGNOSIS — Z79.82: ICD-10-CM

## 2021-11-24 DIAGNOSIS — I71.2: ICD-10-CM

## 2021-11-24 DIAGNOSIS — F32.A: ICD-10-CM

## 2021-11-24 DIAGNOSIS — I25.10: ICD-10-CM

## 2021-11-24 DIAGNOSIS — Z90.710: ICD-10-CM

## 2021-11-24 DIAGNOSIS — R19.7: ICD-10-CM

## 2021-11-24 DIAGNOSIS — E66.01: ICD-10-CM

## 2021-11-24 DIAGNOSIS — Z90.49: ICD-10-CM

## 2021-11-24 DIAGNOSIS — Z79.899: ICD-10-CM

## 2021-11-24 DIAGNOSIS — G89.29: ICD-10-CM

## 2021-11-24 DIAGNOSIS — K90.9: ICD-10-CM

## 2021-11-24 DIAGNOSIS — E87.70: Primary | ICD-10-CM

## 2021-11-24 DIAGNOSIS — R60.0: ICD-10-CM

## 2021-11-24 DIAGNOSIS — Z79.02: ICD-10-CM

## 2021-11-24 DIAGNOSIS — J44.9: ICD-10-CM

## 2021-11-24 DIAGNOSIS — E55.9: ICD-10-CM

## 2021-11-24 DIAGNOSIS — G60.9: ICD-10-CM

## 2021-11-24 DIAGNOSIS — J96.10: ICD-10-CM

## 2021-11-24 DIAGNOSIS — M79.606: ICD-10-CM

## 2021-11-24 DIAGNOSIS — G47.33: ICD-10-CM

## 2021-11-24 LAB
ALBUMIN SERPL-MCNC: 4 G/DL (ref 3.5–5)
ALT SERPL-CCNC: 21 U/L (ref 0–55)
APPEARANCE UR: (no result)
AST SERPL-CCNC: 17 U/L (ref 5–34)
BASOPHILS # BLD: 0.02 K/MM3 (ref 0.02–0.1)
BILIRUB SERPL-MCNC: 0.7 MG/DL (ref 0.2–1.2)
BILIRUB UR QL STRIP.AUTO: (no result)
CALCIUM SERPL-MCNC: 10.2 MG/DL (ref 8.3–10.5)
CO2 SERPL-SCNC: 32 MMOL/L (ref 22–29)
COLOR UR AUTO: YELLOW
EOSINOPHIL # BLD: 0.09 K/MM3 (ref 0.04–0.4)
EOSINOPHIL NFR BLD: 1.2 % (ref 1–5)
ERYTHROCYTE [DISTWIDTH] IN BLOOD BY AUTOMATED COUNT: 15.4 % (ref 11.5–14.5)
GLUCOSE SERPL-MCNC: 147 MG/DL (ref 65–105)
GLUCOSE UR QL STRIP.AUTO: NEGATIVE MG/DL
HCT VFR BLD AUTO: 37.3 % (ref 37–47)
HGB BLD-MCNC: 10.9 G/DL (ref 12.5–16)
KETONES UR QL STRIP.AUTO: NEGATIVE
LEUKOCYTE ESTERASE UR QL STRIP: NEGATIVE
LYMPHOCYTES # BLD: 0.69 K/MM3 (ref 1.5–4)
MCH RBC QN AUTO: 29 PG (ref 27–31)
MCHC RBC AUTO-ENTMCNC: 29 G/DL (ref 33–37)
MCV RBC AUTO: 100 FL (ref 78–100)
MONOCYTES # BLD: 0.48 K/MM3 (ref 0.2–0.8)
MUCOUS THREADS URNS QL MICRO: PRESENT
NEUTROPHILS # BLD: 6.51 K/MM3 (ref 1.4–6.5)
NITRITE UR QL STRIP: NEGATIVE
PH UR STRIP.AUTO: 5 [PH] (ref 5–8)
PLATELET # BLD AUTO: 198 K/MM3 (ref 130–400)
PMV BLD AUTO: 10.7 FL (ref 7.4–10.4)
POTASSIUM SERPL-SCNC: 4.1 MMOL/L (ref 3.5–5.1)
PROT ?TM UR-MCNC: (no result) MG/DL
PROT SERPL-MCNC: 7.1 G/DL (ref 6.4–8.3)
RBC # BLD AUTO: 3.74 M/MM3 (ref 4.1–5.3)
RBC UR QL AUTO: (no result)
SODIUM SERPL-SCNC: 141 MMOL/L (ref 136–145)
SP GR UR STRIP.AUTO: 1.03 (ref 1–1.03)
TROPONIN I SERPL-MCNC: < 0.03 NG/ML (ref ?–0.03)
UROBILINOGEN UR-MCNC: NORMAL MG/DL
WBC # BLD AUTO: 7.8 K/MM3 (ref 4.8–10.8)
WBC #/AREA URNS HPF: (no result) /HPF (ref 0–3)

## 2021-11-24 PROCEDURE — G0378 HOSPITAL OBSERVATION PER HR: HCPCS

## 2021-11-25 VITALS — SYSTOLIC BLOOD PRESSURE: 143 MMHG | DIASTOLIC BLOOD PRESSURE: 66 MMHG

## 2021-11-25 VITALS — SYSTOLIC BLOOD PRESSURE: 146 MMHG | DIASTOLIC BLOOD PRESSURE: 66 MMHG

## 2021-11-25 VITALS — DIASTOLIC BLOOD PRESSURE: 61 MMHG | SYSTOLIC BLOOD PRESSURE: 102 MMHG

## 2021-11-25 VITALS — SYSTOLIC BLOOD PRESSURE: 125 MMHG | DIASTOLIC BLOOD PRESSURE: 70 MMHG

## 2021-11-25 LAB
BASOPHILS # BLD: 0.02 K/MM3 (ref 0.02–0.1)
CALCIUM SERPL-MCNC: 10.2 MG/DL (ref 8.3–10.5)
CO2 SERPL-SCNC: 33 MMOL/L (ref 22–29)
EOSINOPHIL # BLD: 0.14 K/MM3 (ref 0.04–0.4)
EOSINOPHIL NFR BLD: 1.8 % (ref 1–5)
ERYTHROCYTE [DISTWIDTH] IN BLOOD BY AUTOMATED COUNT: 15.5 % (ref 11.5–14.5)
GLUCOSE SERPL-MCNC: 123 MG/DL (ref 65–105)
HCT VFR BLD AUTO: 37.2 % (ref 37–47)
HGB BLD-MCNC: 11.1 G/DL (ref 12.5–16)
LYMPHOCYTES # BLD: 0.84 K/MM3 (ref 1.5–4)
MCH RBC QN AUTO: 29 PG (ref 27–31)
MCHC RBC AUTO-ENTMCNC: 30 G/DL (ref 33–37)
MCV RBC AUTO: 98 FL (ref 78–100)
MONOCYTES # BLD: 0.42 K/MM3 (ref 0.2–0.8)
NEUTROPHILS # BLD: 6.13 K/MM3 (ref 1.4–6.5)
PLATELET # BLD AUTO: 211 K/MM3 (ref 130–400)
PMV BLD AUTO: 10.9 FL (ref 7.4–10.4)
POTASSIUM SERPL-SCNC: 3.7 MMOL/L (ref 3.5–5.1)
RBC # BLD AUTO: 3.79 M/MM3 (ref 4.1–5.3)
SODIUM SERPL-SCNC: 138 MMOL/L (ref 136–145)
WBC # BLD AUTO: 7.6 K/MM3 (ref 4.8–10.8)

## 2022-02-14 ENCOUNTER — HOSPITAL ENCOUNTER (OUTPATIENT)
Dept: HOSPITAL 6 - LAB | Age: 62
End: 2022-02-14
Attending: INTERNAL MEDICINE
Payer: COMMERCIAL

## 2022-02-14 DIAGNOSIS — G47.33: ICD-10-CM

## 2022-02-14 DIAGNOSIS — I10: ICD-10-CM

## 2022-02-14 DIAGNOSIS — J44.9: Primary | ICD-10-CM

## 2022-02-14 DIAGNOSIS — R06.00: ICD-10-CM

## 2022-02-14 DIAGNOSIS — I71.2: ICD-10-CM

## 2022-02-14 DIAGNOSIS — I25.10: ICD-10-CM

## 2022-02-14 DIAGNOSIS — K90.9: ICD-10-CM

## 2022-02-14 LAB
ALBUMIN SERPL-MCNC: 4.1 G/DL (ref 3.5–5)
ALT SERPL-CCNC: 13 U/L (ref 0–55)
AST SERPL-CCNC: 8 U/L (ref 5–34)
BASOPHILS # BLD: 0.02 K/MM3 (ref 0.02–0.1)
BILIRUB SERPL-MCNC: 0.6 MG/DL (ref 0.2–1.2)
CALCIUM SERPL-MCNC: 10.9 MG/DL (ref 8.3–10.5)
CO2 SERPL-SCNC: 35 MMOL/L (ref 22–29)
EOSINOPHIL # BLD: 0.23 K/MM3 (ref 0.04–0.4)
EOSINOPHIL NFR BLD: 2.6 % (ref 1–5)
ERYTHROCYTE [DISTWIDTH] IN BLOOD BY AUTOMATED COUNT: 14.8 % (ref 11.5–14.5)
GLUCOSE SERPL-MCNC: 129 MG/DL (ref 65–105)
HCT VFR BLD AUTO: 40.2 % (ref 37–47)
HGB BLD-MCNC: 12.1 G/DL (ref 12.5–16)
LYMPHOCYTES # BLD: 1.12 K/MM3 (ref 1.5–4)
MAGNESIUM SERPL-MCNC: 1.6 MG/DL (ref 1.6–2.6)
MCH RBC QN AUTO: 29 PG (ref 27–31)
MCHC RBC AUTO-ENTMCNC: 30 G/DL (ref 33–37)
MCV RBC AUTO: 96 FL (ref 78–100)
MONOCYTES # BLD: 0.63 K/MM3 (ref 0.2–0.8)
NEUTROPHILS # BLD: 6.88 K/MM3 (ref 1.4–6.5)
PLATELET # BLD AUTO: 237 K/MM3 (ref 130–400)
PMV BLD AUTO: 10.5 FL (ref 7.4–10.4)
POTASSIUM SERPL-SCNC: 4.3 MMOL/L (ref 3.5–5.1)
PROT SERPL-MCNC: 7.8 G/DL (ref 6.4–8.3)
RBC # BLD AUTO: 4.18 M/MM3 (ref 4.1–5.3)
SODIUM SERPL-SCNC: 141 MMOL/L (ref 136–145)
WBC # BLD AUTO: 8.9 K/MM3 (ref 4.8–10.8)

## 2022-05-20 ENCOUNTER — HOSPITAL ENCOUNTER (OUTPATIENT)
Dept: HOSPITAL 6 - LAB | Age: 62
End: 2022-05-20
Attending: INTERNAL MEDICINE
Payer: COMMERCIAL

## 2022-05-20 DIAGNOSIS — I10: ICD-10-CM

## 2022-05-20 DIAGNOSIS — F32.9: ICD-10-CM

## 2022-05-20 DIAGNOSIS — E61.1: ICD-10-CM

## 2022-05-20 DIAGNOSIS — E53.8: ICD-10-CM

## 2022-05-20 DIAGNOSIS — J44.9: Primary | ICD-10-CM

## 2022-05-20 DIAGNOSIS — I25.10: ICD-10-CM

## 2022-05-20 DIAGNOSIS — G47.33: ICD-10-CM

## 2022-05-20 DIAGNOSIS — R73.09: ICD-10-CM

## 2022-05-20 DIAGNOSIS — I71.2: ICD-10-CM

## 2022-05-20 DIAGNOSIS — K90.9: ICD-10-CM

## 2022-05-20 LAB — T3FREE SERPL-MCNC: 2.8 PG/ML (ref 1.7–3.7)

## 2022-06-09 ENCOUNTER — HOSPITAL ENCOUNTER (EMERGENCY)
Dept: HOSPITAL 6 - ED | Age: 62
Discharge: HOME | End: 2022-06-09
Payer: COMMERCIAL

## 2022-06-09 VITALS — DIASTOLIC BLOOD PRESSURE: 79 MMHG | SYSTOLIC BLOOD PRESSURE: 150 MMHG

## 2022-06-09 VITALS — BODY MASS INDEX: 67.81 KG/M2 | WEIGHT: 293 LBS | HEIGHT: 55 IN

## 2022-06-09 DIAGNOSIS — E66.01: ICD-10-CM

## 2022-06-09 DIAGNOSIS — Z20.822: ICD-10-CM

## 2022-06-09 DIAGNOSIS — Z87.891: ICD-10-CM

## 2022-06-09 DIAGNOSIS — Z28.311: ICD-10-CM

## 2022-06-09 DIAGNOSIS — Z95.5: ICD-10-CM

## 2022-06-09 DIAGNOSIS — R07.9: Primary | ICD-10-CM

## 2022-06-09 LAB
ALBUMIN SERPL-MCNC: 3.9 G/DL (ref 3.5–5)
ALT SERPL-CCNC: 19 U/L (ref 0–55)
AST SERPL-CCNC: 12 U/L (ref 5–34)
BASOPHILS # BLD: 0.01 K/MM3 (ref 0.02–0.1)
BILIRUB SERPL-MCNC: 0.6 MG/DL (ref 0.2–1.2)
CALCIUM SERPL-MCNC: 10.3 MG/DL (ref 8.3–10.5)
CO2 SERPL-SCNC: 32 MMOL/L (ref 22–29)
EOSINOPHIL # BLD: 0.27 K/MM3 (ref 0.04–0.4)
EOSINOPHIL NFR BLD: 3.8 % (ref 1–5)
ERYTHROCYTE [DISTWIDTH] IN BLOOD BY AUTOMATED COUNT: 15.9 % (ref 11.5–14.5)
GLUCOSE SERPL-MCNC: 122 MG/DL (ref 65–105)
HCT VFR BLD AUTO: 40.2 % (ref 37–47)
HGB BLD-MCNC: 12.4 G/DL (ref 12.5–16)
LYMPHOCYTES # BLD: 0.88 K/MM3 (ref 1.5–4)
MCH RBC QN AUTO: 30 PG (ref 27–31)
MCHC RBC AUTO-ENTMCNC: 31 G/DL (ref 33–37)
MCV RBC AUTO: 98 FL (ref 78–100)
MONOCYTES # BLD: 0.51 K/MM3 (ref 0.2–0.8)
NEUTROPHILS # BLD: 5.45 K/MM3 (ref 1.4–6.5)
PLATELET # BLD AUTO: 190 K/MM3 (ref 130–400)
PMV BLD AUTO: 11 FL (ref 7.4–10.4)
POTASSIUM SERPL-SCNC: 3.9 MMOL/L (ref 3.5–5.1)
PROT SERPL-MCNC: 7 G/DL (ref 6.4–8.3)
PROTHROMBIN TIME: 10.6 SECONDS (ref 9–12)
RBC # BLD AUTO: 4.12 M/MM3 (ref 4.1–5.3)
SODIUM SERPL-SCNC: 141 MMOL/L (ref 136–145)
TROPONIN I SERPL-MCNC: < 0.03 NG/ML (ref ?–0.03)
WBC # BLD AUTO: 7.2 K/MM3 (ref 4.8–10.8)

## 2022-09-24 ENCOUNTER — HOSPITAL ENCOUNTER (OUTPATIENT)
Dept: HOSPITAL 6 - ED | Age: 62
Setting detail: OBSERVATION
LOS: 2 days | Discharge: HOME | End: 2022-09-26
Attending: GENERAL PRACTICE | Admitting: GENERAL PRACTICE
Payer: COMMERCIAL

## 2022-09-24 VITALS — BODY MASS INDEX: 50.02 KG/M2 | WEIGHT: 293 LBS | HEIGHT: 64.02 IN

## 2022-09-24 VITALS — SYSTOLIC BLOOD PRESSURE: 123 MMHG | DIASTOLIC BLOOD PRESSURE: 72 MMHG

## 2022-09-24 DIAGNOSIS — R06.00: Primary | ICD-10-CM

## 2022-09-24 DIAGNOSIS — Z20.822: ICD-10-CM

## 2022-09-24 DIAGNOSIS — E66.2: ICD-10-CM

## 2022-09-24 DIAGNOSIS — M79.605: ICD-10-CM

## 2022-09-24 DIAGNOSIS — M79.604: ICD-10-CM

## 2022-09-24 DIAGNOSIS — Z87.891: ICD-10-CM

## 2022-09-24 DIAGNOSIS — Z95.5: ICD-10-CM

## 2022-09-24 DIAGNOSIS — I11.0: ICD-10-CM

## 2022-09-24 DIAGNOSIS — I25.10: ICD-10-CM

## 2022-09-24 DIAGNOSIS — I50.33: ICD-10-CM

## 2022-09-24 LAB
ALBUMIN SERPL-MCNC: 4.4 G/DL (ref 3.4–4.8)
ALT SERPL-CCNC: 35 U/L (ref 0–55)
AST SERPL-CCNC: 21 U/L (ref 5–34)
BASOPHILS # BLD: 0.04 K/MM3 (ref 0.02–0.1)
BILIRUB SERPL-MCNC: 0.8 MG/DL (ref 0.2–1.2)
CALCIUM SERPL-MCNC: 10.3 MG/DL (ref 8.3–10.5)
CO2 SERPL-SCNC: 25 MMOL/L (ref 23–31)
D DIMER PPP FEU-MCNC: 0.87 MG/L FEU (ref 0.15–0.5)
EOSINOPHIL # BLD: 0.3 K/MM3 (ref 0.04–0.4)
EOSINOPHIL NFR BLD: 4.6 % (ref 1–5)
ERYTHROCYTE [DISTWIDTH] IN BLOOD BY AUTOMATED COUNT: 14.5 % (ref 11.5–14.5)
GLUCOSE SERPL-MCNC: 188 MG/DL (ref 65–105)
HCT VFR BLD AUTO: 43.2 % (ref 37–47)
HGB BLD-MCNC: 13.6 G/DL (ref 12.5–16)
LYMPHOCYTES # BLD: 1.3 K/MM3 (ref 1.5–4)
MCH RBC QN AUTO: 31 PG (ref 27–31)
MCHC RBC AUTO-ENTMCNC: 32 G/DL (ref 33–37)
MCV RBC AUTO: 99 FL (ref 78–100)
MONOCYTES # BLD: 0.52 K/MM3 (ref 0.2–0.8)
NEUTROPHILS # BLD: 4.38 K/MM3 (ref 1.4–6.5)
PLATELET # BLD AUTO: 216 K/MM3 (ref 130–400)
PMV BLD AUTO: 12.2 FL (ref 7.4–10.4)
POTASSIUM SERPL-SCNC: 4.3 MMOL/L (ref 3.5–5.1)
PROT SERPL-MCNC: 7.6 G/DL (ref 6.2–8.1)
RBC # BLD AUTO: 4.36 M/MM3 (ref 4.1–5.3)
SODIUM SERPL-SCNC: 142 MMOL/L (ref 136–145)
WBC # BLD AUTO: 6.6 K/MM3 (ref 4.8–10.8)

## 2022-09-24 PROCEDURE — G0378 HOSPITAL OBSERVATION PER HR: HCPCS

## 2022-09-25 VITALS — DIASTOLIC BLOOD PRESSURE: 82 MMHG | SYSTOLIC BLOOD PRESSURE: 133 MMHG

## 2022-09-25 VITALS — DIASTOLIC BLOOD PRESSURE: 84 MMHG | SYSTOLIC BLOOD PRESSURE: 149 MMHG

## 2022-09-25 VITALS — DIASTOLIC BLOOD PRESSURE: 56 MMHG | SYSTOLIC BLOOD PRESSURE: 120 MMHG

## 2022-09-25 VITALS — SYSTOLIC BLOOD PRESSURE: 144 MMHG | DIASTOLIC BLOOD PRESSURE: 75 MMHG

## 2022-09-25 VITALS — DIASTOLIC BLOOD PRESSURE: 69 MMHG | SYSTOLIC BLOOD PRESSURE: 123 MMHG

## 2022-09-26 VITALS — DIASTOLIC BLOOD PRESSURE: 84 MMHG | SYSTOLIC BLOOD PRESSURE: 158 MMHG

## 2022-09-26 VITALS — DIASTOLIC BLOOD PRESSURE: 82 MMHG | SYSTOLIC BLOOD PRESSURE: 138 MMHG

## 2022-09-26 VITALS — DIASTOLIC BLOOD PRESSURE: 66 MMHG | SYSTOLIC BLOOD PRESSURE: 147 MMHG

## 2022-09-29 ENCOUNTER — HOSPITAL ENCOUNTER (OUTPATIENT)
Dept: HOSPITAL 19 - COL.PUL | Age: 62
End: 2022-09-29
Attending: PHYSICIAN ASSISTANT
Payer: COMMERCIAL

## 2022-09-29 DIAGNOSIS — J96.12: Primary | ICD-10-CM

## 2023-02-06 ENCOUNTER — HOSPITAL ENCOUNTER (EMERGENCY)
Dept: HOSPITAL 6 - ED | Age: 63
Discharge: HOME | End: 2023-02-06
Payer: MEDICARE

## 2023-02-06 VITALS — BODY MASS INDEX: 50.02 KG/M2 | WEIGHT: 293 LBS | HEIGHT: 64.02 IN

## 2023-02-06 VITALS — DIASTOLIC BLOOD PRESSURE: 57 MMHG | SYSTOLIC BLOOD PRESSURE: 118 MMHG

## 2023-02-06 DIAGNOSIS — Z79.899: ICD-10-CM

## 2023-02-06 DIAGNOSIS — E66.01: ICD-10-CM

## 2023-02-06 DIAGNOSIS — F41.9: Primary | ICD-10-CM

## 2023-02-06 DIAGNOSIS — Z87.891: ICD-10-CM

## 2023-02-06 DIAGNOSIS — Z20.822: ICD-10-CM

## 2023-02-06 LAB
ALBUMIN SERPL-MCNC: 4.2 G/DL (ref 3.4–4.8)
ALT SERPL-CCNC: 21 U/L (ref 0–55)
APPEARANCE UR: CLEAR
AST SERPL-CCNC: 14 U/L (ref 5–34)
BASOPHILS # BLD: 0.02 K/MM3 (ref 0.02–0.1)
BILIRUB SERPL-MCNC: 0.7 MG/DL (ref 0.2–1.2)
BILIRUB UR QL STRIP.AUTO: NEGATIVE
CALCIUM SERPL-MCNC: 10.5 MG/DL (ref 8.3–10.5)
CO2 SERPL-SCNC: 27 MMOL/L (ref 23–31)
COLOR UR AUTO: YELLOW
EOSINOPHIL # BLD: 0.27 K/MM3 (ref 0.04–0.4)
EOSINOPHIL NFR BLD: 3.1 % (ref 1–5)
ERYTHROCYTE [DISTWIDTH] IN BLOOD BY AUTOMATED COUNT: 14.1 % (ref 11.5–14.5)
GLUCOSE SERPL-MCNC: 197 MG/DL (ref 65–105)
GLUCOSE UR QL STRIP.AUTO: NEGATIVE
HCT VFR BLD AUTO: 44.5 % (ref 37–47)
HGB BLD-MCNC: 13.9 G/DL (ref 12.5–16)
KETONES UR QL STRIP.AUTO: NEGATIVE
LEUKOCYTE ESTERASE UR QL STRIP: NEGATIVE
LYMPHOCYTES # BLD: 1 K/MM3 (ref 1.5–4)
MCH RBC QN AUTO: 32 PG (ref 27–31)
MCHC RBC AUTO-ENTMCNC: 31 G/DL (ref 33–37)
MCV RBC AUTO: 102 FL (ref 78–100)
MONOCYTES # BLD: 0.58 K/MM3 (ref 0.2–0.8)
NEUTROPHILS # BLD: 6.83 K/MM3 (ref 1.4–6.5)
NITRITE UR QL STRIP: NEGATIVE
PH UR STRIP.AUTO: 5 [PH] (ref 5–8)
PLATELET # BLD AUTO: 200 K/MM3 (ref 130–400)
PMV BLD AUTO: 12 FL (ref 7.4–10.4)
POTASSIUM SERPL-SCNC: 4.8 MMOL/L (ref 3.5–5.1)
PROT ?TM UR-MCNC: NEGATIVE MG/DL
PROT SERPL-MCNC: 7.4 G/DL (ref 6.2–8.1)
RBC # BLD AUTO: 4.38 M/MM3 (ref 4.1–5.3)
RBC UR QL AUTO: NEGATIVE
SODIUM SERPL-SCNC: 140 MMOL/L (ref 136–145)
SP GR UR STRIP.AUTO: 1.01 (ref 1–1.03)
TROPONIN I SERPL-MCNC: < 0.03 NG/ML (ref ?–0.03)
UROBILINOGEN UR-MCNC: NORMAL MG/DL
WBC # BLD AUTO: 8.7 K/MM3 (ref 4.8–10.8)
WBC #/AREA URNS HPF: 0 /HPF (ref 0–3)

## 2023-04-03 ENCOUNTER — HOSPITAL ENCOUNTER (OUTPATIENT)
Dept: HOSPITAL 19 - COL.RAD | Age: 63
End: 2023-04-03
Payer: MEDICARE

## 2023-04-03 DIAGNOSIS — Z82.49: Primary | ICD-10-CM

## 2023-11-23 ENCOUNTER — HOSPITAL ENCOUNTER (EMERGENCY)
Dept: HOSPITAL 6 - ED | Age: 63
Discharge: HOME | End: 2023-11-23
Payer: MEDICARE

## 2023-11-23 VITALS — BODY MASS INDEX: 50.02 KG/M2 | HEIGHT: 64.02 IN | WEIGHT: 293 LBS

## 2023-11-23 VITALS — SYSTOLIC BLOOD PRESSURE: 160 MMHG | DIASTOLIC BLOOD PRESSURE: 76 MMHG

## 2023-11-23 DIAGNOSIS — J18.9: Primary | ICD-10-CM

## 2023-11-23 DIAGNOSIS — Z99.81: ICD-10-CM

## 2023-11-23 DIAGNOSIS — J44.9: ICD-10-CM

## 2023-11-23 DIAGNOSIS — Z87.891: ICD-10-CM

## 2023-11-23 LAB
ALBUMIN SERPL-MCNC: 4.1 G/DL (ref 3.4–4.8)
ALT SERPL-CCNC: 12 U/L (ref 0–55)
AST SERPL-CCNC: 8 U/L (ref 5–34)
BASOPHILS # BLD: 0.02 K/MM3 (ref 0.02–0.1)
BILIRUB SERPL-MCNC: 0.6 MG/DL (ref 0.2–1.2)
CALCIUM SERPL-MCNC: 10.5 MG/DL (ref 8.3–10.5)
CO2 SERPL-SCNC: 26 MMOL/L (ref 23–31)
EOSINOPHIL # BLD: 0.31 K/MM3 (ref 0.04–0.4)
EOSINOPHIL NFR BLD: 3.8 % (ref 1–5)
ERYTHROCYTE [DISTWIDTH] IN BLOOD BY AUTOMATED COUNT: 14.2 % (ref 11.5–14.5)
GLUCOSE SERPL-MCNC: 206 MG/DL (ref 65–105)
HCT VFR BLD AUTO: 41.6 % (ref 37–47)
HGB BLD-MCNC: 13.1 G/DL (ref 12.5–16)
LIPASE SERPL-CCNC: 22 U/L (ref 8–78)
LYMPHOCYTES # BLD: 1.19 K/MM3 (ref 1.5–4)
MCH RBC QN AUTO: 32 PG (ref 27–31)
MCHC RBC AUTO-ENTMCNC: 32 G/DL (ref 33–37)
MCV RBC AUTO: 101 FL (ref 78–100)
MONOCYTES # BLD: 0.48 K/MM3 (ref 0.2–0.8)
NEUTROPHILS # BLD: 6.05 K/MM3 (ref 1.4–6.5)
PLATELET # BLD AUTO: 216 K/MM3 (ref 130–400)
PMV BLD AUTO: 11.1 FL (ref 7.4–10.4)
PROT SERPL-MCNC: 7.3 G/DL (ref 6.2–8.1)
PROTHROMBIN TIME: 10.1 SECONDS (ref 9–12)
RBC # BLD AUTO: 4.12 M/MM3 (ref 4.1–5.3)
SODIUM SERPL-SCNC: 142 MMOL/L (ref 136–145)
WBC # BLD AUTO: 8.1 K/MM3 (ref 4.8–10.8)

## 2024-01-22 ENCOUNTER — HOSPITAL ENCOUNTER (OUTPATIENT)
Dept: HOSPITAL 6 - LAB | Age: 64
End: 2024-01-22
Attending: INTERNAL MEDICINE
Payer: MEDICARE

## 2024-01-22 DIAGNOSIS — E11.9: ICD-10-CM

## 2024-01-22 DIAGNOSIS — K90.9: ICD-10-CM

## 2024-01-22 DIAGNOSIS — I10: Primary | ICD-10-CM

## 2024-01-22 DIAGNOSIS — E05.90: ICD-10-CM

## 2024-01-22 LAB
ALBUMIN SERPL-MCNC: 4.4 G/DL (ref 3.4–4.8)
ALT SERPL-CCNC: 14 U/L (ref 0–55)
AST SERPL-CCNC: 10 U/L (ref 5–34)
BASOPHILS # BLD: 0.02 K/MM3 (ref 0.02–0.1)
BILIRUB SERPL-MCNC: 0.6 MG/DL (ref 0.2–1.2)
CALCIUM SERPL-MCNC: 10.8 MG/DL (ref 8.3–10.5)
CO2 SERPL-SCNC: 30 MMOL/L (ref 23–31)
EOSINOPHIL # BLD: 0.24 K/MM3 (ref 0.04–0.4)
EOSINOPHIL NFR BLD: 3 % (ref 1–5)
ERYTHROCYTE [DISTWIDTH] IN BLOOD BY AUTOMATED COUNT: 13.8 % (ref 11.5–14.5)
GLUCOSE SERPL-MCNC: 114 MG/DL (ref 65–105)
HCT VFR BLD AUTO: 42.1 % (ref 37–47)
HGB BLD-MCNC: 13.4 G/DL (ref 12.5–16)
LYMPHOCYTES # BLD: 1.19 K/MM3 (ref 1.5–4)
MAGNESIUM SERPL-MCNC: 1.74 MG/DL (ref 1.6–2.6)
MCH RBC QN AUTO: 32 PG (ref 27–31)
MCHC RBC AUTO-ENTMCNC: 32 G/DL (ref 33–37)
MCV RBC AUTO: 99 FL (ref 78–100)
MONOCYTES # BLD: 0.43 K/MM3 (ref 0.2–0.8)
NEUTROPHILS # BLD: 6.11 K/MM3 (ref 1.4–6.5)
PLATELET # BLD AUTO: 209 K/MM3 (ref 130–400)
PMV BLD AUTO: 11 FL (ref 7.4–10.4)
PROT SERPL-MCNC: 7.4 G/DL (ref 6.2–8.1)
RBC # BLD AUTO: 4.26 M/MM3 (ref 4.1–5.3)
SODIUM SERPL-SCNC: 143 MMOL/L (ref 136–145)
WBC # BLD AUTO: 8 K/MM3 (ref 4.8–10.8)

## 2024-01-23 LAB — T3FREE SERPL-MCNC: 2.9 PG/ML (ref 1.7–3.7)

## 2024-05-31 ENCOUNTER — HOSPITAL ENCOUNTER (OUTPATIENT)
Dept: HOSPITAL 6 - LAB | Age: 64
End: 2024-05-31
Attending: INTERNAL MEDICINE
Payer: MEDICARE

## 2024-05-31 DIAGNOSIS — I25.10: ICD-10-CM

## 2024-05-31 DIAGNOSIS — Z11.59: Primary | ICD-10-CM

## 2024-05-31 DIAGNOSIS — E11.9: ICD-10-CM

## 2024-05-31 DIAGNOSIS — K90.9: ICD-10-CM

## 2024-07-19 LAB
CREAT UR-MCNC: (no result) MG/DL
HCV AB S/CO SERPL IA: NON REACTIVE

## 2024-07-20 LAB
ALBUMIN SERPL-MCNC: 4.4 G/DL (ref 3.4–4.8)
ALT SERPL-CCNC: 13 U/L (ref 0–55)
APPEARANCE UR: CLEAR
AST SERPL-CCNC: 10 U/L (ref 5–34)
BASOPHILS # BLD: 0.01 K/MM3 (ref 0.02–0.1)
BILIRUB SERPL-MCNC: 0.8 MG/DL (ref 0.2–1.2)
BILIRUB UR QL STRIP.AUTO: NEGATIVE
CALCIUM SERPL-MCNC: 10.8 MG/DL (ref 8.3–10.5)
CO2 SERPL-SCNC: 27 MMOL/L (ref 23–31)
COLOR UR AUTO: YELLOW
EOSINOPHIL # BLD: 0.29 K/MM3 (ref 0.04–0.4)
EOSINOPHIL NFR BLD: 3.5 % (ref 1–5)
ERYTHROCYTE [DISTWIDTH] IN BLOOD BY AUTOMATED COUNT: 13.9 % (ref 11.5–14.5)
GLUCOSE SERPL-MCNC: 118 MG/DL (ref 65–105)
GLUCOSE UR QL STRIP.AUTO: NEGATIVE
HCT VFR BLD AUTO: 44.3 % (ref 37–47)
HGB BLD-MCNC: 14.1 G/DL (ref 12.5–16)
KETONES UR QL STRIP.AUTO: NEGATIVE
LEUKOCYTE ESTERASE UR QL STRIP: NEGATIVE
LYMPHOCYTES # BLD: 1.53 K/MM3 (ref 1.5–4)
MCH RBC QN AUTO: 32 PG (ref 27–31)
MCHC RBC AUTO-ENTMCNC: 32 G/DL (ref 33–37)
MCV RBC AUTO: 99 FL (ref 78–100)
MONOCYTES # BLD: 0.56 K/MM3 (ref 0.2–0.8)
MUCOUS THREADS URNS QL MICRO: PRESENT
NEUTROPHILS # BLD: 5.9 K/MM3 (ref 1.4–6.5)
NITRITE UR QL STRIP: NEGATIVE
PH UR STRIP.AUTO: 5.5 [PH] (ref 5–8)
PLATELET # BLD AUTO: 221 K/MM3 (ref 130–400)
PMV BLD AUTO: 10.9 FL (ref 7.4–10.4)
PROT ?TM UR-MCNC: NEGATIVE MG/DL
PROT SERPL-MCNC: 7.9 G/DL (ref 6.2–8.1)
RBC # BLD AUTO: 4.48 M/MM3 (ref 4.1–5.3)
RBC UR QL AUTO: NEGATIVE
SODIUM SERPL-SCNC: 143 MMOL/L (ref 136–145)
SP GR UR STRIP.AUTO: 1.01 (ref 1–1.03)
WBC # BLD AUTO: 8.3 K/MM3 (ref 4.8–10.8)
WBC #/AREA URNS HPF: (no result) /HPF (ref 0–3)

## 2024-08-27 ENCOUNTER — HOSPITAL ENCOUNTER (OUTPATIENT)
Dept: HOSPITAL 6 - LAB | Age: 64
End: 2024-08-27
Attending: INTERNAL MEDICINE
Payer: MEDICARE

## 2024-08-27 DIAGNOSIS — E05.90: ICD-10-CM

## 2024-08-27 DIAGNOSIS — K90.9: ICD-10-CM

## 2024-08-27 DIAGNOSIS — I25.10: Primary | ICD-10-CM

## 2024-08-27 DIAGNOSIS — E11.9: ICD-10-CM

## 2024-08-27 LAB
ALBUMIN SERPL-MCNC: 4.3 G/DL (ref 3.4–4.8)
ALT SERPL-CCNC: 12 U/L (ref 0–55)
AST SERPL-CCNC: 8 U/L (ref 5–34)
BASOPHILS # BLD: 0.02 K/MM3 (ref 0.02–0.1)
BILIRUB SERPL-MCNC: 0.9 MG/DL (ref 0.2–1.2)
CALCIUM SERPL-MCNC: 10.9 MG/DL (ref 8.3–10.5)
CO2 SERPL-SCNC: 28 MMOL/L (ref 23–31)
EOSINOPHIL # BLD: 0.35 K/MM3 (ref 0.04–0.4)
EOSINOPHIL NFR BLD: 4.8 % (ref 1–5)
ERYTHROCYTE [DISTWIDTH] IN BLOOD BY AUTOMATED COUNT: 13.8 % (ref 11.5–14.5)
GLUCOSE SERPL-MCNC: 109 MG/DL (ref 65–105)
HCT VFR BLD AUTO: 43.4 % (ref 37–47)
HGB BLD-MCNC: 13.7 G/DL (ref 12.5–16)
LYMPHOCYTES # BLD: 1.31 K/MM3 (ref 1.5–4)
MAGNESIUM SERPL-MCNC: 1.99 MG/DL (ref 1.6–2.6)
MCH RBC QN AUTO: 31 PG (ref 27–31)
MCHC RBC AUTO-ENTMCNC: 32 G/DL (ref 33–37)
MCV RBC AUTO: 99 FL (ref 78–100)
MONOCYTES # BLD: 0.39 K/MM3 (ref 0.2–0.8)
NEUTROPHILS # BLD: 5.26 K/MM3 (ref 1.4–6.5)
PLATELET # BLD AUTO: 190 K/MM3 (ref 130–400)
PMV BLD AUTO: 11.4 FL (ref 7.4–10.4)
PROT SERPL-MCNC: 7.6 G/DL (ref 6.2–8.1)
RBC # BLD AUTO: 4.4 M/MM3 (ref 4.1–5.3)
SODIUM SERPL-SCNC: 141 MMOL/L (ref 136–145)
WBC # BLD AUTO: 7.4 K/MM3 (ref 4.8–10.8)

## 2024-08-28 LAB — T3FREE SERPL-MCNC: 2.7 PG/ML (ref 1.7–3.7)

## 2024-09-24 ENCOUNTER — HOSPITAL ENCOUNTER (EMERGENCY)
Dept: HOSPITAL 6 - ED | Age: 64
LOS: 1 days | Discharge: HOME | End: 2024-09-25
Payer: MEDICARE

## 2024-09-24 DIAGNOSIS — E66.9: ICD-10-CM

## 2024-09-24 DIAGNOSIS — M54.50: Primary | ICD-10-CM

## 2024-09-25 VITALS — DIASTOLIC BLOOD PRESSURE: 86 MMHG | SYSTOLIC BLOOD PRESSURE: 139 MMHG

## 2024-11-21 ENCOUNTER — HOSPITAL ENCOUNTER (OUTPATIENT)
Dept: HOSPITAL 6 - MAMMO | Age: 64
End: 2024-11-21
Attending: INTERNAL MEDICINE
Payer: MEDICARE

## 2024-11-21 DIAGNOSIS — Z12.31: Primary | ICD-10-CM

## 2025-01-13 ENCOUNTER — HOSPITAL ENCOUNTER (OUTPATIENT)
Dept: HOSPITAL 6 - MSO | Age: 65
End: 2025-01-13
Payer: MEDICARE

## 2025-01-13 DIAGNOSIS — Z12.11: Primary | ICD-10-CM

## 2025-01-13 DIAGNOSIS — Z87.891: ICD-10-CM

## 2025-01-13 DIAGNOSIS — G47.33: ICD-10-CM

## 2025-01-13 DIAGNOSIS — Z99.81: ICD-10-CM

## 2025-01-13 DIAGNOSIS — J45.909: ICD-10-CM

## 2025-01-13 DIAGNOSIS — Z86.0101: ICD-10-CM

## 2025-02-07 ENCOUNTER — HOSPITAL ENCOUNTER (OUTPATIENT)
Dept: HOSPITAL 6 - LAB | Age: 65
End: 2025-02-07
Attending: INTERNAL MEDICINE
Payer: MEDICARE

## 2025-02-07 DIAGNOSIS — E11.9: ICD-10-CM

## 2025-02-07 DIAGNOSIS — I25.10: Primary | ICD-10-CM

## 2025-02-07 DIAGNOSIS — E05.90: ICD-10-CM

## 2025-02-07 DIAGNOSIS — K90.9: ICD-10-CM

## 2025-02-07 LAB
ALBUMIN SERPL-MCNC: 4.5 G/DL (ref 3.4–4.8)
ALT SERPL-CCNC: 10 U/L (ref 0–55)
APPEARANCE UR: (no result)
AST SERPL-CCNC: 9 U/L (ref 5–34)
BASOPHILS # BLD: 0.01 K/MM3 (ref 0.02–0.1)
BILIRUB SERPL-MCNC: 0.7 MG/DL (ref 0.2–1.2)
BILIRUB UR QL STRIP.AUTO: NEGATIVE
CALCIUM SERPL-MCNC: 11.1 MG/DL (ref 8.3–10.5)
CO2 SERPL-SCNC: 29 MMOL/L (ref 23–31)
COLOR UR AUTO: YELLOW
CREAT UR-MCNC: 169 MG/DL (ref 63–166)
EOSINOPHIL # BLD: 0.27 K/MM3 (ref 0.04–0.4)
EOSINOPHIL NFR BLD: 2.9 % (ref 1–5)
ERYTHROCYTE [DISTWIDTH] IN BLOOD BY AUTOMATED COUNT: 13.5 % (ref 11.5–14.5)
GLUCOSE SERPL-MCNC: 98 MG/DL (ref 65–105)
GLUCOSE UR QL STRIP.AUTO: NEGATIVE
HCT VFR BLD AUTO: 43.6 % (ref 37–47)
HGB BLD-MCNC: 13.7 G/DL (ref 12.5–16)
KETONES UR QL STRIP.AUTO: NEGATIVE
LEUKOCYTE ESTERASE UR QL STRIP: NEGATIVE
LYMPHOCYTES # BLD: 1.66 K/MM3 (ref 1.5–4)
MAGNESIUM SERPL-MCNC: 2.22 MG/DL (ref 1.6–2.6)
MCH RBC QN AUTO: 31 PG (ref 27–31)
MCHC RBC AUTO-ENTMCNC: 31 G/DL (ref 33–37)
MCV RBC AUTO: 100 FL (ref 78–100)
MONOCYTES # BLD: 0.57 K/MM3 (ref 0.2–0.8)
MUCOUS THREADS URNS QL MICRO: PRESENT
NEUTROPHILS # BLD: 6.89 K/MM3 (ref 1.4–6.5)
NITRITE UR QL STRIP: NEGATIVE
PH UR STRIP.AUTO: 5 [PH] (ref 5–8)
PLATELET # BLD AUTO: 247 K/MM3 (ref 130–400)
PMV BLD AUTO: 11.3 FL (ref 7.4–10.4)
PROT ?TM UR-MCNC: NEGATIVE MG/DL
PROT SERPL-MCNC: 8.3 G/DL (ref 6.2–8.1)
RBC # BLD AUTO: 4.37 M/MM3 (ref 4.1–5.3)
RBC UR QL AUTO: NEGATIVE
SODIUM SERPL-SCNC: 143 MMOL/L (ref 136–145)
SP GR UR STRIP.AUTO: > 1.03 (ref 1–1.03)
T3FREE SERPL-MCNC: 3 PG/ML (ref 1.7–3.7)
WBC # BLD AUTO: 9.4 K/MM3 (ref 4.8–10.8)
WBC #/AREA URNS HPF: (no result) /HPF (ref 0–3)